# Patient Record
Sex: MALE | Employment: OTHER | URBAN - METROPOLITAN AREA
[De-identification: names, ages, dates, MRNs, and addresses within clinical notes are randomized per-mention and may not be internally consistent; named-entity substitution may affect disease eponyms.]

---

## 2024-06-20 ENCOUNTER — TRANSFERRED RECORDS (OUTPATIENT)
Dept: HEALTH INFORMATION MANAGEMENT | Facility: CLINIC | Age: 69
End: 2024-06-20

## 2024-07-29 ENCOUNTER — TELEPHONE (OUTPATIENT)
Dept: WOUND CARE | Facility: HOSPITAL | Age: 69
End: 2024-07-29

## 2024-07-31 ENCOUNTER — MEDICAL CORRESPONDENCE (OUTPATIENT)
Dept: HEALTH INFORMATION MANAGEMENT | Facility: CLINIC | Age: 69
End: 2024-07-31

## 2024-10-01 NOTE — H&P (VIEW-ONLY)
Lobito Zheng is a 69 y.o. male who presents for a pre-op physical.    Nursing Notes:   Alireza Valladares MA  10/1/2024  9:06 AM  Signed  Date of Surgery: 10/22/2024  Surgeon: Dr Farrar and Dr Lynch  Surgery/Procedure name: cystoprostatectomy    Hospital/Surgical Facility:  [facility] Fax Number:   Surgery type: inpatient  Primary Physician: Pcp, No      Risk Factors  Does the patient have:  Asthma no  CAD no  Renal insufficiency no  Diabetes yes  CHF no  Recent MI no  Valvular heart disease no    Medication Review  Is the patient currently taking:  Antihistamines no  Aspirin or ibuprofen products no  Blood thinners no   If YES, Does patient have to hold medication? N/a  Metformin yes  Seizure medication no    Bleeding Risk  Does the patient have a history of:  Bleeding or easy bruising no  Bleeding into the joints or muscles no  Frequent nosebleeds no    Did the patient have difficulty with bleeding after:  Loss of teeth or dental extractions no  Previous surgery no  Previous injury no    Is there a family history of:  A blood or bleeding disorder no  Blood transfusion no   Malignant Hyperthermia no  Adverse reactions to anesthesia no    Anesthesia Risk  Does the patient have a history of:  Difficulty waking up from anesthesia no  Excessive postop nausea and vomiting no  Sensitivity to narcotics no  Specific sensitivity to anesthesia no  Sleep apnea yes    ALIREZA VALLADARES, [facility]  ....................  10/1/2024   8:55 AM          Reason for surgery -He presents for preoperative evaluation prior to undergoing any cystoprostatectomy for urothelial carcinoma.  He has never had any anesthetic complications nor bleeding issues.  He has had no change in his cardiopulmonary status and he is able to walk up a flight of stairs and do exertional activities without difficulty.    Past Medical History:   . Date     Acid reflux      Cancer of ureteric orifice (HC)      Diabetes mellitus (HC)     A1C 6.7      Erectile dysfunction      Hyperlipidemia      Hypertension        Past Surgical History:   . Laterality Date     COLONOSCOPY SCREENING  2008     COLONOSCOPY W/ POLYPECTOMY  12/17/2018    repeat in 10 years     COLONOSCOPY WITH BIOPSIES AND POYLPECTOMY  02/29/2024    repeat in 1 year     FRACTURE SURGERY       Transurethral Resection of Large Bladder Tumor, Right Ureteral Stent Placement  05/16/2024       No family history on file.    Current Outpatient Medications   Medication Sig Dispense Refill     acetaminophen (TYLENOL EXTRA STRGTH) 500 mg tablet Take 2 Tablets (1,000 mg) by mouth every 6 hours. Max acetaminophen dose: 4000mg in 24 hrs.       amoxicillin (AMOXIL) 500 mg capsule Take 1 Capsule (500 mg) by mouth once daily. To prevent bladder infections 90 Capsule 0     Cranberry 500 mg cap Take 500 mg by mouth once daily. May increase to 2xdaily prn       ibuprofen (ADVIL; MOTRIN) 200 mg tablet Take 3 Tablets (600 mg) by mouth every 6 hours.       medication order composer CPAP  0     melatonin 12 mg tab Take 1 Tablet by mouth at bedtime.       metFORMIN (GLUCOPHAGE) 500 mg tablet Take 500 mg by mouth 2 times daily.       omeprazole (PRILOSEC) 20 mg Delayed-Release capsule Take 1 capsule by mouth once daily before a meal.  0     ondansetron (ZOFRAN) 8 mg tablet Take 1 Tablet (8 mg) by mouth every 8 hours if needed for Nausea/Vomiting. 90 Tablet 3     polyethylene glycol-electrolyte (NULYTELY) 420 gram solution        prochlorperazine (COMPAZINE) 10 mg tablet Take 1 Tablet (10 mg) by mouth every 6 hours if needed for Nausea/Vomiting. 90 Tablet 3     rosuvastatin (CRESTOR) 20 mg tablet Take 20 mg by mouth at bedtime.       sennosides (Senna) 8.6 mg tablet Take 8.6 mg by mouth once daily.       tamsulosin (FLOMAX) 0.4 mg capsule Take 2 Capsules (0.8 mg) by mouth at bedtime. 180 Capsule 0     No current facility-administered medications for this visit.     Medications have been reviewed by me and are current to  "the best of my knowledge and ability.      Allergies: Patient has no known allergies.    Social History     Tobacco Use     Smoking status: Former     Current packs/day: 0.00     Types: Cigarettes     Quit date: 1993     Years since quittin.1     Smokeless tobacco: Never   Substance Use Topics     Alcohol use: Not Currently     Alcohol/week: 12.0 standard drinks of alcohol     Types: 12 Cans of beer per week     Comment: Stopped drinking in 2024       Patient has otherwise been feeling well.  No weight loss.  No headaches.  No visual problems.  Denies chest pain or SOB.  No hemoptysis.  No GI or  sxs.  No skin problems.  No focal neurologic sxs.  No dizziness or fainting spells.  No new skin lesions. Remainder of the ROS is unremarkable.    OBJECTIVE: /92 (Cuff Site: Right Arm, Cuff Size: Adult Large)   Pulse 68   Ht 1.753 m (5' 9\")   Wt 103.6 kg (228 lb 6.4 oz)   SpO2 100%   BMI 33.73 kg/m    EXAM:  General Appearance: Pleasant, alert, appropriate appearance for age. No acute distress  Ear Exam: Normal TM's bilaterally. Normal auditory canals and external ears. Non-tender.  Nose Exam: Normal external nose, mucous membranes, and septum.  OroPharynx Exam: Dental hygiene adequate. Normal buccal mucosa. Normal pharynx.  Chest/Respiratory Exam: Normal chest wall and respirations. Clear to auscultation.  Cardiovascular Exam: Regular rate and rhythm. S1, S2, no murmur, click, gallop, or rubs.  Psychiatric Exam: Alert and oriented, appropriate affect.  Extremities:  No edema, erythema or tenderness.    His EKG shows normal sinus rhythm with no acute ST or T wave changes.  Component  Ref Range & Units  9:25 AM  (10/1/24) 3 wk ago  (24) 1 mo ago  (24) 1 mo ago  (8/15/24) 1 mo ago  (24) 1 mo ago  (24) 1 mo ago  (8/3/24)   WHITE BLOOD COUNT          4.5 - 13.5 thou/cu mm 6.2 5.1 <redacted file path> 5.1 <redacted file path> 5.4 <redacted file path> 6.5 <redacted file path> 6.9 " <redacted file path> 6.6 <redacted file path>   RED BLOOD COUNT            4.70 - 6.10 mil/cu mm 3.55 Low  3.28 <redacted file path> Low <redacted file path>  3.15 <redacted file path> Low <redacted file path>  3.55 <redacted file path> Low <redacted file path>  3.66 <redacted file path> Low <redacted file path>  3.36 <redacted file path> Low <redacted file path>  3.48 <redacted file path> Low <redacted file path>    HEMOGLOBIN                14.0 - 18.0 g/dL 10.4 Low  9.1 <redacted file path> Low <redacted file path>  8.8 <redacted file path> Low <redacted file path>  9.8 <redacted file path> Low <redacted file path>  10.1 <redacted file path> Low <redacted file path>  9.2 <redacted file path> Low <redacted file path>  9.6 <redacted file path> Low <redacted file path>    HEMATOCRIT                42.0 - 52.0 % 32.4 Low  27.7 <redacted file path> Low <redacted file path>  26.8 <redacted file path> Low <redacted file path>  29.5 <redacted file path> Low <redacted file path>  30.7 <redacted file path> Low <redacted file path>  27.8 <redacted file path> Low <redacted file path>  28.9 <redacted file path> Low <redacted file path>    MCV                        80 - 94 fL 91 85 <redacted file path> 85 <redacted file path> 83 <redacted file path> 84 <redacted file path> 83 <redacted file path> 83 <redacted file path>   MCH                        27.0 - 31.0 pg 29.3 27.7 <redacted file path> 27.9 <redacted file path> 27.6 <redacted file path> 27.6 <redacted file path> 27.4 <redacted file path> 27.6 <redacted file path>   MCHC                      33.0 - 36.0 g/dL 32.1 Low  32.9 <redacted file path> Low <redacted file path>  32.8 <redacted file path> Low <redacted file path>  33.2 <redacted file path> 32.9 <redacted file path> Low <redacted file path>  33.1 <redacted file path> 33.2 <redacted file path>   RDW                        11.6 - 14.8 % 22.3 High  20.0 <redacted file path> High <redacted file path>  19.9 <redacted  file path> High <redacted file path>  16.4 <redacted file path> High <redacted file path>  15.9 <redacted file path> High <redacted file path>  15.0 <redacted file path> High <redacted file path>  15.1 <redacted file path> High <redacted file path>    PLATELET COUNT            130 - 400 thou/cu mm 230 248 <redacted file path> 141 <redacted file path> 257 <redacted file path> 214 <redacted file path> 70 <redacted file path> Low <redacted file path>  60 <redacted file path> Low <redacted file path>    MPV                        7.4 - 10.4 fL 10.0 9.5 <redacted file path> 9.7 <redacted file path> 9.3 <redacted file path> 9.8 <redacted file path> 11.0 <redacted file path> High <redacted file path>  10.9 <redacted file path> High <redacted file path>    NRBC                      0.0 - 0.9 % 0.0 0.6 <redacted file path> 0.0 <redacted file path> 0.0 <redacted file path> 0.0 <redacted file path> 0.0 <redacted file path> 0.0 <redacted file path>   % NEUT  37.0 - 80.0 % 55.8  57.0 <redacted file path>  65.6 <redacted file path> 66.5 <redacted file path> 66.3 <redacted file path>   % LYMPH  10.0 - 50.0 % 26.5  26.4 <redacted file path>  20.7 <redacted file path> 21.5 <redacted file path> 21.0 <redacted file path>   % MONO  0.0 - 12.0 % 14.0 High   14.2 <redacted file path> High <redacted file path>   11.0 <redacted file path> 10.3 <redacted file path> 10.5 <redacted file path>   % EOS  0.0 - 7.0 % 1.6  1.0 <redacted file path>  0.9 <redacted file path> 0.7 <redacted file path> 0.5 <redacted file path>   % BASO  0.0 - 2.5 % 0.8  0.4 <redacted file path>  0.3 <redacted file path> 0.1 <redacted file path> 0.2 <redacted file path>   % IMMATURE GRAN (METAS,MYELOS,PROS)  % 1.3  1.0 <redacted file path>  1.5 <redacted file path> 0.9 <redacted file path> 1.5 <redacted file path>   ABSOLUTE NEUTROPHILS      2.0 - 6.9 thou/cu mm 3.5  2.9 <redacted file path>  4.3 <redacted file path> 4.6 <redacted file path> 4.4 <redacted file path>    ABSOLUTE LYMPHOCYTES      0.6 - 3.4 thou/cu mm 1.6  1.3 <redacted file path>  1.4 <redacted file path> 1.5 <redacted file path> 1.4 <redacted file path>   ABSOLUTE MONOCYTES        0.0 - 1.0 thou/cu mm 0.9  0.7 <redacted file path>  0.7 <redacted file path> 0.7 <redacted file path> 0.7 <redacted file path>   ABSOLUTE EOSINOPHILS      <=0.7 thou/cu mm 0.1  0.1 <redacted file path>  0.1 <redacted file path> 0.1 <redacted file path> 0.0 <redacted file path>   ABSOLUTE BASOPHILS        <0.2 thou/cu mm 0.1  0.0 <redacted file path>  0.0 <redacted file path> 0.0 <redacted file path> 0.0 <redacted file path>   ABSOLUTE IMMATURE GRANULOCYTES(METAS,MYELOS,PROS)  <0.3 thou/cu mm 0.1  0.1 <redacted file path>  0.1 <redacted file path> 0.1 <redacted file path> 0.1 <redacted file path>   Resulting Agency [facility] <redacted file path>           Component  Ref Range & Units  9:25 AM  (10/1/24) 3 wk ago  (9/5/24) 1 mo ago  (8/29/24) 1 mo ago  (8/15/24) 1 mo ago  (8/8/24) 1 mo ago  (8/4/24) 1 mo ago  (8/3/24)   SODIUM  136 - 145 mmol/L 142 139 <redacted file path> 142 <redacted file path> 140 <redacted file path> 142 <redacted file path> 140 <redacted file path> 139 <redacted file path>   POTASSIUM  3.5 - 5.1 mmol/L 4.5 4.4 <redacted file path> 4.3 <redacted file path> 4.3 <redacted file path> 4.6 <redacted file path> 4.2 <redacted file path> 4.2 <redacted file path>   CHLORIDE  98 - 107 mmol/L 110 High  107 <redacted file path> 109 <redacted file path> High <redacted file path>  108 <redacted file path> High <redacted file path>  110 <redacted file path> High <redacted file path>  108 <redacted file path> High <redacted file path>  107 <redacted file path>   CO2,TOTAL  22 - 31 mmol/L 24 24 <redacted file path> 21 <redacted file path> Low <redacted file path>  24 <redacted file path> 21 <redacted file path> Low <redacted file path>  20 <redacted file path> Low <redacted file path>  21 <redacted file path> Low <redacted file path>     ANION GAP  5 - 18 8 8 <redacted file path> 12 <redacted file path> 8 <redacted file path> 11 <redacted file path> 12 <redacted file path> 11 <redacted file path>   GLUCOSE  70 - 99 mg/dL 113 High  102 <redacted file path> High <redacted file path>  101 <redacted file path> High <redacted file path>  96 <redacted file path> 101 <redacted file path> High <redacted file path>  86 <redacted file path> 90 <redacted file path>   CALCIUM  8.4 - 10.2 mg/dL 10.6 High  9.6 <redacted file path> 9.4 <redacted file path> 9.6 <redacted file path> 10.0 <redacted file path> 9.4 <redacted file path> 9.5 <redacted file path>   BUN  8 - 25 mg/dL 16 20 <redacted file path> 17 <redacted file path> 17 <redacted file path> 15 <redacted file path> 12 <redacted file path> 15 <redacted file path>   CREATININE  0.72 - 1.25 mg/dL 1.42 High  1.22 <redacted file path> 1.23 <redacted file path> 1.19 <redacted file path> 1.25 <redacted file path> 1.19 <redacted file path> 1.24 <redacted file path>   BUN/CREAT RATIO            10 - 20                 11 16 <redacted file path> 14 <redacted file path> 14 <redacted file path> 12 <redacted file path> 10 <redacted file path> 12 <redacted file path>   ALBUMIN  3.2 - 4.6 g/dL 4.3 4.0 <redacted file path> 4.0 <redacted file path> 3.9 <redacted file path> 3.8 <redacted file path>     PROTEIN,TOTAL  6.0 - 8.0 g/dL 7.1 6.8 <redacted file path> 6.8 <redacted file path> 6.6 <redacted file path> 7.3 <redacted file path>     GLOBULIN                  2.0 - 3.7 g/dL 2.8 2.8 <redacted file path> 2.8 <redacted file path> 2.7 <redacted file path> 3.5 <redacted file path>     A/G RATIO  1.0 - 2.0                 1.5 1.4 <redacted file path> 1.4 <redacted file path> 1.4 <redacted file path> 1.1 <redacted file path>     BILIRUBIN,TOTAL  0.2 - 1.2 mg/dL 0.4 0.3 <redacted file path> 0.3 <redacted file path> 0.3 <redacted file path> 0.3 <redacted file path>     ALK PHOSPHATASE IRMA  40 - 150 IU/L 54 57 <redacted file  path> 58 <redacted file path> 59 <redacted file path> 72 <redacted file path>     ALT (SGPT) IRMA  <=55 IU/L 17 26 <redacted file path> 24 <redacted file path> 30 <redacted file path> 37 <redacted file path>     AST (SGOT) IRMA  5 - 34 U/L 21 19 <redacted file path> 20 <redacted file path> 19 <redacted file path> 19 <redacted file path>     eGFR  >90 mL/min/1.73m2 54 Low  65 <redacted file path> Low <redacted file path>  CM 64 <redacted file path> Low <redacted file path>  CM 67 <redacted file path> Low <redacted file path>  CM 63 <redacted file path> Low <redacted file path>  CM 67 <redacted file path> Low <redacted file path>  CM 64 <redacted file path> Low <redacted file path>  CM   Comment: As of 03/15/2022, eGFR is calculated by the CKD-EPI creatinine equation without race adjustment.  eGFR can be influenced by muscle mass, exercise, and diet.  The reported eGFR is an estimation only and is only applicable if the renal function is stable.   Resulting Agency IR[facility] <redacted file path> [facility] <redacted file path> [facility] <redacted file path> [facility] <redacted file path> [facility] <redacted file path> [facility] <redacted file path> IRMA <redacted file path>                  ASSESSMENT :/PLAN:    ICD-10-CM    1. Pre-op evaluation  Z01.818 COMP METABOLIC PANEL     CBC AND DIFFERENTIAL     EKG 12 LEAD     IL ECG ROUTINE ECG W/LEAST 12 LDS TRCG ONLY W/O I&R      2. Urothelial carcinoma (HC)  C68.9           He has no contraindications for anesthesia of any type.  He is at low risk for perianesthetic complications for this procedure.  No further preoperative workup is recommended.  His last hemoglobin A1c was 6.6.  He should hold his metformin the day before surgery and take no medications the morning of surgery.  He should avoid nonsteroidal anti-inflammatories and aspirin 1 week prior to surgery.      Sid Neil MD ....................  10/1/2024   9:26 AM

## 2024-10-17 ENCOUNTER — HOSPITAL ENCOUNTER (OUTPATIENT)
Dept: WOUND CARE | Facility: HOSPITAL | Age: 69
Discharge: HOME | End: 2024-10-17
Attending: PHYSICAL MEDICINE & REHABILITATION | Admitting: PHYSICAL MEDICINE & REHABILITATION
Payer: COMMERCIAL

## 2024-10-17 DIAGNOSIS — Z43.6 ATTENTION TO UROSTOMY (H): Primary | ICD-10-CM

## 2024-10-17 PROCEDURE — G0463 HOSPITAL OUTPT CLINIC VISIT: HCPCS

## 2024-10-17 NOTE — PROGRESS NOTES
[facility]  PRE-OP OSTOMY CONSULTATION AND ASSESSMENT    INTAKE    Type of Stoma Planned: Permanent Urostomy    Diagnosis Pertinent to Stoma: Cancer - Bladder  Date of Diagnosis: early 2024  Type of Surgery: Ileal Conduit Surgery Date: 10/22/2024  Surgeon: [facility]: [facility]    Current Living Situation: House  Anticipated Discharge Location Post Hospital Stay: House    EDUCATION ASSESSMENT  Present for Teaching Session: Patient and Spouse/Significant Other   Present: NA    Significant Vision Issues: No  Hand Dexterity Issues: No    Pertinent Information/Identified Barriers to Independent Care: none    Stoma Site Marking    Assessed Patient while: Lying, Sitting, and Standing  Marked in all 4 quadrants, RUQ being preferred with: Permanent Marker, Covered with Tegaderm, and Marker and extra Tegaderm sent with patient    EDUCATION    Teaching Folder Given: NA  Instruction: WOC Role, Activity, Anatomy, Bathing: Ostomy supplies are water proof, Pouching Products: Showed pouching system, and discussed hospital stay, they were told 5 days,     Total Time Spent with Patient: 45 minutes    sherie jett

## 2024-10-21 ENCOUNTER — ANESTHESIA EVENT (OUTPATIENT)
Dept: SURGERY | Facility: HOSPITAL | Age: 69
DRG: 653 | End: 2024-10-21
Payer: COMMERCIAL

## 2024-10-21 RX ORDER — PROCHLORPERAZINE MALEATE 10 MG
10 TABLET ORAL EVERY 6 HOURS PRN
Status: ON HOLD | COMMUNITY
End: 2024-10-22

## 2024-10-21 RX ORDER — ONDANSETRON 8 MG/1
8 TABLET, FILM COATED ORAL EVERY 8 HOURS PRN
Status: ON HOLD | COMMUNITY
End: 2024-10-22

## 2024-10-21 RX ORDER — TAMSULOSIN HYDROCHLORIDE 0.4 MG/1
0.8 CAPSULE ORAL DAILY
COMMUNITY

## 2024-10-21 RX ORDER — ROSUVASTATIN CALCIUM 40 MG/1
40 TABLET, COATED ORAL AT BEDTIME
COMMUNITY

## 2024-10-21 RX ORDER — ACETAMINOPHEN 500 MG
1000 TABLET ORAL EVERY 6 HOURS PRN
COMMUNITY

## 2024-10-21 RX ORDER — AMOXICILLIN 500 MG/1
500 CAPSULE ORAL DAILY
Status: ON HOLD | COMMUNITY
Start: 2024-08-09 | End: 2024-10-31

## 2024-10-21 RX ORDER — SENNOSIDES 8.6 MG
1 TABLET ORAL DAILY
Status: ON HOLD | COMMUNITY
End: 2024-10-22

## 2024-10-21 RX ORDER — CHLORHEXIDINE GLUCONATE 4 %
12 LIQUID (ML) TOPICAL AT BEDTIME
COMMUNITY

## 2024-10-22 ENCOUNTER — HOSPITAL ENCOUNTER (INPATIENT)
Facility: HOSPITAL | Age: 69
LOS: 9 days | Discharge: HOME HEALTH | DRG: 653 | End: 2024-10-31
Attending: STUDENT IN AN ORGANIZED HEALTH CARE EDUCATION/TRAINING PROGRAM | Admitting: STUDENT IN AN ORGANIZED HEALTH CARE EDUCATION/TRAINING PROGRAM
Payer: COMMERCIAL

## 2024-10-22 ENCOUNTER — ANESTHESIA (OUTPATIENT)
Dept: SURGERY | Facility: HOSPITAL | Age: 69
DRG: 653 | End: 2024-10-22
Payer: COMMERCIAL

## 2024-10-22 DIAGNOSIS — Z43.6 ATTENTION TO UROSTOMY (H): Primary | ICD-10-CM

## 2024-10-22 DIAGNOSIS — K56.0 PARALYTIC ILEUS (H): ICD-10-CM

## 2024-10-22 DIAGNOSIS — Z78.9 DEEP VEIN THROMBOSIS (DVT) PROPHYLAXIS PRESCRIBED AT DISCHARGE: ICD-10-CM

## 2024-10-22 PROBLEM — C67.9 BLADDER CANCER (H): Status: ACTIVE | Noted: 2024-10-22

## 2024-10-22 LAB
ABO/RH(D): NORMAL
ABO/RH(D): NORMAL
ANION GAP SERPL CALCULATED.3IONS-SCNC: 14 MMOL/L (ref 7–15)
ANTIBODY SCREEN: NEGATIVE
ANTIBODY SCREEN: NEGATIVE
BUN SERPL-MCNC: 20.3 MG/DL (ref 8–23)
CALCIUM SERPL-MCNC: 8.9 MG/DL (ref 8.8–10.4)
CHLORIDE SERPL-SCNC: 104 MMOL/L (ref 98–107)
CREAT SERPL-MCNC: 1.48 MG/DL (ref 0.67–1.17)
EGFRCR SERPLBLD CKD-EPI 2021: 52 ML/MIN/1.73M2
ERYTHROCYTE [DISTWIDTH] IN BLOOD BY AUTOMATED COUNT: 17.4 % (ref 10–15)
EST. AVERAGE GLUCOSE BLD GHB EST-MCNC: 105 MG/DL
GLUCOSE BLDC GLUCOMTR-MCNC: 157 MG/DL (ref 70–99)
GLUCOSE BLDC GLUCOMTR-MCNC: 163 MG/DL (ref 70–99)
GLUCOSE BLDC GLUCOMTR-MCNC: 181 MG/DL (ref 70–99)
GLUCOSE BLDC GLUCOMTR-MCNC: 91 MG/DL (ref 70–99)
GLUCOSE SERPL-MCNC: 167 MG/DL (ref 70–99)
HBA1C MFR BLD: 5.3 %
HCO3 SERPL-SCNC: 22 MMOL/L (ref 22–29)
HCT VFR BLD AUTO: 33 % (ref 40–53)
HGB BLD-MCNC: 10.4 G/DL (ref 13.3–17.7)
HGB BLD-MCNC: 10.6 G/DL (ref 13.3–17.7)
HOLD SPECIMEN: NORMAL
HOLD SPECIMEN: NORMAL
IRON BINDING CAPACITY (ROCHE): 292 UG/DL (ref 240–430)
IRON SATN MFR SERPL: 17 % (ref 15–46)
IRON SERPL-MCNC: 51 UG/DL (ref 61–157)
MCH RBC QN AUTO: 29.1 PG (ref 26.5–33)
MCHC RBC AUTO-ENTMCNC: 31.5 G/DL (ref 31.5–36.5)
MCV RBC AUTO: 92 FL (ref 78–100)
PLATELET # BLD AUTO: 125 10E3/UL (ref 150–450)
PLATELET # BLD AUTO: 143 10E3/UL (ref 150–450)
POTASSIUM SERPL-SCNC: 4.6 MMOL/L (ref 3.4–5.3)
RBC # BLD AUTO: 3.58 10E6/UL (ref 4.4–5.9)
RETICS # AUTO: 0.09 10E6/UL (ref 0.03–0.1)
RETICS/RBC NFR AUTO: 2.4 % (ref 0.5–2)
SODIUM SERPL-SCNC: 140 MMOL/L (ref 135–145)
SPECIMEN EXPIRATION DATE: NORMAL
SPECIMEN EXPIRATION DATE: NORMAL
WBC # BLD AUTO: 12.2 10E3/UL (ref 4–11)

## 2024-10-22 PROCEDURE — 88331 PATH CONSLTJ SURG 1 BLK 1SPC: CPT | Mod: TC | Performed by: STUDENT IN AN ORGANIZED HEALTH CARE EDUCATION/TRAINING PROGRAM

## 2024-10-22 PROCEDURE — 258N000003 HC RX IP 258 OP 636: Performed by: ANESTHESIOLOGY

## 2024-10-22 PROCEDURE — 8E0W4CZ ROBOTIC ASSISTED PROCEDURE OF TRUNK REGION, PERCUTANEOUS ENDOSCOPIC APPROACH: ICD-10-PCS | Performed by: STUDENT IN AN ORGANIZED HEALTH CARE EDUCATION/TRAINING PROGRAM

## 2024-10-22 PROCEDURE — 36415 COLL VENOUS BLD VENIPUNCTURE: CPT | Performed by: STUDENT IN AN ORGANIZED HEALTH CARE EDUCATION/TRAINING PROGRAM

## 2024-10-22 PROCEDURE — 258N000003 HC RX IP 258 OP 636: Performed by: STUDENT IN AN ORGANIZED HEALTH CARE EDUCATION/TRAINING PROGRAM

## 2024-10-22 PROCEDURE — 258N000003 HC RX IP 258 OP 636: Performed by: NURSE ANESTHETIST, CERTIFIED REGISTERED

## 2024-10-22 PROCEDURE — 250N000009 HC RX 250: Performed by: ANESTHESIOLOGY

## 2024-10-22 PROCEDURE — 250N000011 HC RX IP 250 OP 636: Performed by: STUDENT IN AN ORGANIZED HEALTH CARE EDUCATION/TRAINING PROGRAM

## 2024-10-22 PROCEDURE — 120N000001 HC R&B MED SURG/OB

## 2024-10-22 PROCEDURE — 250N000011 HC RX IP 250 OP 636: Performed by: NURSE ANESTHETIST, CERTIFIED REGISTERED

## 2024-10-22 PROCEDURE — 250N000013 HC RX MED GY IP 250 OP 250 PS 637: Performed by: ANESTHESIOLOGY

## 2024-10-22 PROCEDURE — 0TTB4ZZ RESECTION OF BLADDER, PERCUTANEOUS ENDOSCOPIC APPROACH: ICD-10-PCS | Performed by: STUDENT IN AN ORGANIZED HEALTH CARE EDUCATION/TRAINING PROGRAM

## 2024-10-22 PROCEDURE — 86901 BLOOD TYPING SEROLOGIC RH(D): CPT | Performed by: NURSE PRACTITIONER

## 2024-10-22 PROCEDURE — 250N000009 HC RX 250: Performed by: NURSE ANESTHETIST, CERTIFIED REGISTERED

## 2024-10-22 PROCEDURE — 250N000025 HC SEVOFLURANE, PER MIN: Performed by: STUDENT IN AN ORGANIZED HEALTH CARE EDUCATION/TRAINING PROGRAM

## 2024-10-22 PROCEDURE — 0VT34ZZ RESECTION OF BILATERAL SEMINAL VESICLES, PERCUTANEOUS ENDOSCOPIC APPROACH: ICD-10-PCS | Performed by: STUDENT IN AN ORGANIZED HEALTH CARE EDUCATION/TRAINING PROGRAM

## 2024-10-22 PROCEDURE — 94660 CPAP INITIATION&MGMT: CPT

## 2024-10-22 PROCEDURE — 85049 AUTOMATED PLATELET COUNT: CPT | Performed by: STUDENT IN AN ORGANIZED HEALTH CARE EDUCATION/TRAINING PROGRAM

## 2024-10-22 PROCEDURE — 5A09357 ASSISTANCE WITH RESPIRATORY VENTILATION, LESS THAN 24 CONSECUTIVE HOURS, CONTINUOUS POSITIVE AIRWAY PRESSURE: ICD-10-PCS | Performed by: STUDENT IN AN ORGANIZED HEALTH CARE EDUCATION/TRAINING PROGRAM

## 2024-10-22 PROCEDURE — 250N000013 HC RX MED GY IP 250 OP 250 PS 637: Performed by: STUDENT IN AN ORGANIZED HEALTH CARE EDUCATION/TRAINING PROGRAM

## 2024-10-22 PROCEDURE — 0VT04ZZ RESECTION OF PROSTATE, PERCUTANEOUS ENDOSCOPIC APPROACH: ICD-10-PCS | Performed by: STUDENT IN AN ORGANIZED HEALTH CARE EDUCATION/TRAINING PROGRAM

## 2024-10-22 PROCEDURE — 360N000080 HC SURGERY LEVEL 7, PER MIN: Performed by: STUDENT IN AN ORGANIZED HEALTH CARE EDUCATION/TRAINING PROGRAM

## 2024-10-22 PROCEDURE — 99223 1ST HOSP IP/OBS HIGH 75: CPT | Performed by: STUDENT IN AN ORGANIZED HEALTH CARE EDUCATION/TRAINING PROGRAM

## 2024-10-22 PROCEDURE — 83550 IRON BINDING TEST: CPT | Performed by: STUDENT IN AN ORGANIZED HEALTH CARE EDUCATION/TRAINING PROGRAM

## 2024-10-22 PROCEDURE — 82728 ASSAY OF FERRITIN: CPT | Performed by: STUDENT IN AN ORGANIZED HEALTH CARE EDUCATION/TRAINING PROGRAM

## 2024-10-22 PROCEDURE — 82607 VITAMIN B-12: CPT | Performed by: STUDENT IN AN ORGANIZED HEALTH CARE EDUCATION/TRAINING PROGRAM

## 2024-10-22 PROCEDURE — 0TP94DZ REMOVAL OF INTRALUMINAL DEVICE FROM URETER, PERCUTANEOUS ENDOSCOPIC APPROACH: ICD-10-PCS | Performed by: STUDENT IN AN ORGANIZED HEALTH CARE EDUCATION/TRAINING PROGRAM

## 2024-10-22 PROCEDURE — 36415 COLL VENOUS BLD VENIPUNCTURE: CPT | Performed by: NURSE PRACTITIONER

## 2024-10-22 PROCEDURE — 83036 HEMOGLOBIN GLYCOSYLATED A1C: CPT | Performed by: STUDENT IN AN ORGANIZED HEALTH CARE EDUCATION/TRAINING PROGRAM

## 2024-10-22 PROCEDURE — 85018 HEMOGLOBIN: CPT | Performed by: STUDENT IN AN ORGANIZED HEALTH CARE EDUCATION/TRAINING PROGRAM

## 2024-10-22 PROCEDURE — P9045 ALBUMIN (HUMAN), 5%, 250 ML: HCPCS | Performed by: NURSE ANESTHETIST, CERTIFIED REGISTERED

## 2024-10-22 PROCEDURE — 86900 BLOOD TYPING SEROLOGIC ABO: CPT | Performed by: NURSE PRACTITIONER

## 2024-10-22 PROCEDURE — 710N000009 HC RECOVERY PHASE 1, LEVEL 1, PER MIN: Performed by: STUDENT IN AN ORGANIZED HEALTH CARE EDUCATION/TRAINING PROGRAM

## 2024-10-22 PROCEDURE — 250N000011 HC RX IP 250 OP 636: Performed by: ANESTHESIOLOGY

## 2024-10-22 PROCEDURE — 0T1847C BYPASS BILATERAL URETERS TO ILEOCUTANEOUS WITH AUTOLOGOUS TISSUE SUBSTITUTE, PERCUTANEOUS ENDOSCOPIC APPROACH: ICD-10-PCS | Performed by: STUDENT IN AN ORGANIZED HEALTH CARE EDUCATION/TRAINING PROGRAM

## 2024-10-22 PROCEDURE — 82746 ASSAY OF FOLIC ACID SERUM: CPT | Performed by: STUDENT IN AN ORGANIZED HEALTH CARE EDUCATION/TRAINING PROGRAM

## 2024-10-22 PROCEDURE — 51595 REMOVE BLADDER/REVISE TRACT: CPT | Performed by: NURSE ANESTHETIST, CERTIFIED REGISTERED

## 2024-10-22 PROCEDURE — 272N000001 HC OR GENERAL SUPPLY STERILE: Performed by: STUDENT IN AN ORGANIZED HEALTH CARE EDUCATION/TRAINING PROGRAM

## 2024-10-22 PROCEDURE — 80048 BASIC METABOLIC PNL TOTAL CA: CPT | Performed by: STUDENT IN AN ORGANIZED HEALTH CARE EDUCATION/TRAINING PROGRAM

## 2024-10-22 PROCEDURE — 250N000011 HC RX IP 250 OP 636: Performed by: NURSE PRACTITIONER

## 2024-10-22 PROCEDURE — 370N000017 HC ANESTHESIA TECHNICAL FEE, PER MIN: Performed by: STUDENT IN AN ORGANIZED HEALTH CARE EDUCATION/TRAINING PROGRAM

## 2024-10-22 PROCEDURE — C2617 STENT, NON-COR, TEM W/O DEL: HCPCS | Performed by: STUDENT IN AN ORGANIZED HEALTH CARE EDUCATION/TRAINING PROGRAM

## 2024-10-22 PROCEDURE — 07TC4ZZ RESECTION OF PELVIS LYMPHATIC, PERCUTANEOUS ENDOSCOPIC APPROACH: ICD-10-PCS | Performed by: STUDENT IN AN ORGANIZED HEALTH CARE EDUCATION/TRAINING PROGRAM

## 2024-10-22 PROCEDURE — 999N000141 HC STATISTIC PRE-PROCEDURE NURSING ASSESSMENT: Performed by: STUDENT IN AN ORGANIZED HEALTH CARE EDUCATION/TRAINING PROGRAM

## 2024-10-22 PROCEDURE — 36415 COLL VENOUS BLD VENIPUNCTURE: CPT | Performed by: NURSE ANESTHETIST, CERTIFIED REGISTERED

## 2024-10-22 PROCEDURE — 51595 REMOVE BLADDER/REVISE TRACT: CPT | Performed by: ANESTHESIOLOGY

## 2024-10-22 PROCEDURE — 86900 BLOOD TYPING SEROLOGIC ABO: CPT | Performed by: NURSE ANESTHETIST, CERTIFIED REGISTERED

## 2024-10-22 PROCEDURE — 85045 AUTOMATED RETICULOCYTE COUNT: CPT | Performed by: STUDENT IN AN ORGANIZED HEALTH CARE EDUCATION/TRAINING PROGRAM

## 2024-10-22 PROCEDURE — 999N000157 HC STATISTIC RCP TIME EA 10 MIN

## 2024-10-22 PROCEDURE — 86901 BLOOD TYPING SEROLOGIC RH(D): CPT | Performed by: NURSE ANESTHETIST, CERTIFIED REGISTERED

## 2024-10-22 PROCEDURE — 250N000009 HC RX 250: Performed by: STUDENT IN AN ORGANIZED HEALTH CARE EDUCATION/TRAINING PROGRAM

## 2024-10-22 DEVICE — URINARY DIVERSION STENT SET
Type: IMPLANTABLE DEVICE | Site: URETER | Status: FUNCTIONAL
Brand: PERCUFLEX™ URINARY DIVERSION STENT SET

## 2024-10-22 RX ORDER — ACETAMINOPHEN 325 MG/1
650 TABLET ORAL EVERY 4 HOURS PRN
Status: DISCONTINUED | OUTPATIENT
Start: 2024-10-25 | End: 2024-10-31 | Stop reason: HOSPADM

## 2024-10-22 RX ORDER — BUPIVACAINE HYDROCHLORIDE 5 MG/ML
INJECTION, SOLUTION PERINEURAL PRN
Status: DISCONTINUED | OUTPATIENT
Start: 2024-10-22 | End: 2024-10-22 | Stop reason: HOSPADM

## 2024-10-22 RX ORDER — ONDANSETRON 2 MG/ML
4 INJECTION INTRAMUSCULAR; INTRAVENOUS EVERY 6 HOURS PRN
Status: DISCONTINUED | OUTPATIENT
Start: 2024-10-22 | End: 2024-10-31 | Stop reason: HOSPADM

## 2024-10-22 RX ORDER — NALOXONE HYDROCHLORIDE 0.4 MG/ML
0.4 INJECTION, SOLUTION INTRAMUSCULAR; INTRAVENOUS; SUBCUTANEOUS
Status: DISCONTINUED | OUTPATIENT
Start: 2024-10-22 | End: 2024-10-31 | Stop reason: HOSPADM

## 2024-10-22 RX ORDER — PANTOPRAZOLE SODIUM 40 MG/1
40 TABLET, DELAYED RELEASE ORAL
Status: DISCONTINUED | OUTPATIENT
Start: 2024-10-22 | End: 2024-10-31 | Stop reason: HOSPADM

## 2024-10-22 RX ORDER — ACETAMINOPHEN 325 MG/1
975 TABLET ORAL EVERY 8 HOURS
Status: COMPLETED | OUTPATIENT
Start: 2024-10-22 | End: 2024-10-25

## 2024-10-22 RX ORDER — LABETALOL HYDROCHLORIDE 5 MG/ML
5 INJECTION, SOLUTION INTRAVENOUS EVERY 10 MIN PRN
Status: DISCONTINUED | OUTPATIENT
Start: 2024-10-22 | End: 2024-10-22 | Stop reason: HOSPADM

## 2024-10-22 RX ORDER — MAGNESIUM SULFATE 4 G/50ML
4 INJECTION INTRAVENOUS ONCE
Status: COMPLETED | OUTPATIENT
Start: 2024-10-22 | End: 2024-10-22

## 2024-10-22 RX ORDER — KETAMINE HYDROCHLORIDE 10 MG/ML
INJECTION INTRAMUSCULAR; INTRAVENOUS PRN
Status: DISCONTINUED | OUTPATIENT
Start: 2024-10-22 | End: 2024-10-22

## 2024-10-22 RX ORDER — PROPOFOL 10 MG/ML
INJECTION, EMULSION INTRAVENOUS PRN
Status: DISCONTINUED | OUTPATIENT
Start: 2024-10-22 | End: 2024-10-22

## 2024-10-22 RX ORDER — AMOXICILLIN 250 MG
1 CAPSULE ORAL 2 TIMES DAILY
Status: CANCELLED | OUTPATIENT
Start: 2024-10-22

## 2024-10-22 RX ORDER — BISACODYL 10 MG
10 SUPPOSITORY, RECTAL RECTAL DAILY PRN
Status: CANCELLED | OUTPATIENT
Start: 2024-10-25

## 2024-10-22 RX ORDER — ACETAMINOPHEN 325 MG/1
975 TABLET ORAL ONCE
Status: COMPLETED | OUTPATIENT
Start: 2024-10-22 | End: 2024-10-22

## 2024-10-22 RX ORDER — DEXAMETHASONE SODIUM PHOSPHATE 4 MG/ML
4 INJECTION, SOLUTION INTRA-ARTICULAR; INTRALESIONAL; INTRAMUSCULAR; INTRAVENOUS; SOFT TISSUE
Status: DISCONTINUED | OUTPATIENT
Start: 2024-10-22 | End: 2024-10-22 | Stop reason: HOSPADM

## 2024-10-22 RX ORDER — NALOXONE HYDROCHLORIDE 0.4 MG/ML
0.1 INJECTION, SOLUTION INTRAMUSCULAR; INTRAVENOUS; SUBCUTANEOUS
Status: DISCONTINUED | OUTPATIENT
Start: 2024-10-22 | End: 2024-10-22 | Stop reason: HOSPADM

## 2024-10-22 RX ORDER — DEXTROSE MONOHYDRATE 25 G/50ML
25-50 INJECTION, SOLUTION INTRAVENOUS
Status: DISCONTINUED | OUTPATIENT
Start: 2024-10-22 | End: 2024-10-22

## 2024-10-22 RX ORDER — METHOCARBAMOL 500 MG/1
500 TABLET, FILM COATED ORAL EVERY 6 HOURS PRN
Status: DISCONTINUED | OUTPATIENT
Start: 2024-10-22 | End: 2024-10-31 | Stop reason: HOSPADM

## 2024-10-22 RX ORDER — NALOXONE HYDROCHLORIDE 0.4 MG/ML
0.2 INJECTION, SOLUTION INTRAMUSCULAR; INTRAVENOUS; SUBCUTANEOUS
Status: DISCONTINUED | OUTPATIENT
Start: 2024-10-22 | End: 2024-10-31 | Stop reason: HOSPADM

## 2024-10-22 RX ORDER — DEXAMETHASONE SODIUM PHOSPHATE 10 MG/ML
INJECTION, SOLUTION INTRAMUSCULAR; INTRAVENOUS PRN
Status: DISCONTINUED | OUTPATIENT
Start: 2024-10-22 | End: 2024-10-22

## 2024-10-22 RX ORDER — SODIUM CHLORIDE, SODIUM LACTATE, POTASSIUM CHLORIDE, AND CALCIUM CHLORIDE .6; .31; .03; .02 G/100ML; G/100ML; G/100ML; G/100ML
IRRIGANT IRRIGATION PRN
Status: DISCONTINUED | OUTPATIENT
Start: 2024-10-22 | End: 2024-10-22 | Stop reason: HOSPADM

## 2024-10-22 RX ORDER — SODIUM CHLORIDE, SODIUM LACTATE, POTASSIUM CHLORIDE, CALCIUM CHLORIDE 600; 310; 30; 20 MG/100ML; MG/100ML; MG/100ML; MG/100ML
INJECTION, SOLUTION INTRAVENOUS CONTINUOUS
Status: DISCONTINUED | OUTPATIENT
Start: 2024-10-22 | End: 2024-10-22 | Stop reason: HOSPADM

## 2024-10-22 RX ORDER — HYDROMORPHONE HCL IN WATER/PF 6 MG/30 ML
0.2 PATIENT CONTROLLED ANALGESIA SYRINGE INTRAVENOUS EVERY 5 MIN PRN
Status: DISCONTINUED | OUTPATIENT
Start: 2024-10-22 | End: 2024-10-22 | Stop reason: HOSPADM

## 2024-10-22 RX ORDER — HEPARIN SODIUM 5000 [USP'U]/.5ML
5000 INJECTION, SOLUTION INTRAVENOUS; SUBCUTANEOUS ONCE
Status: COMPLETED | OUTPATIENT
Start: 2024-10-22 | End: 2024-10-22

## 2024-10-22 RX ORDER — HYDROMORPHONE HCL IN WATER/PF 6 MG/30 ML
0.4 PATIENT CONTROLLED ANALGESIA SYRINGE INTRAVENOUS
Status: DISCONTINUED | OUTPATIENT
Start: 2024-10-22 | End: 2024-10-24

## 2024-10-22 RX ORDER — NICOTINE POLACRILEX 4 MG
15-30 LOZENGE BUCCAL
Status: DISCONTINUED | OUTPATIENT
Start: 2024-10-22 | End: 2024-10-22

## 2024-10-22 RX ORDER — FENTANYL CITRATE 50 UG/ML
INJECTION, SOLUTION INTRAMUSCULAR; INTRAVENOUS PRN
Status: DISCONTINUED | OUTPATIENT
Start: 2024-10-22 | End: 2024-10-22

## 2024-10-22 RX ORDER — GINSENG 100 MG
CAPSULE ORAL 3 TIMES DAILY
Status: DISCONTINUED | OUTPATIENT
Start: 2024-10-22 | End: 2024-10-22

## 2024-10-22 RX ORDER — HYDROMORPHONE HYDROCHLORIDE 1 MG/ML
0.5 INJECTION, SOLUTION INTRAMUSCULAR; INTRAVENOUS; SUBCUTANEOUS EVERY 5 MIN PRN
Status: DISCONTINUED | OUTPATIENT
Start: 2024-10-22 | End: 2024-10-22 | Stop reason: HOSPADM

## 2024-10-22 RX ORDER — CEFAZOLIN SODIUM/WATER 2 G/20 ML
2 SYRINGE (ML) INTRAVENOUS SEE ADMIN INSTRUCTIONS
Status: DISCONTINUED | OUTPATIENT
Start: 2024-10-22 | End: 2024-10-22 | Stop reason: HOSPADM

## 2024-10-22 RX ORDER — SODIUM CHLORIDE, SODIUM LACTATE, POTASSIUM CHLORIDE, CALCIUM CHLORIDE 600; 310; 30; 20 MG/100ML; MG/100ML; MG/100ML; MG/100ML
INJECTION, SOLUTION INTRAVENOUS CONTINUOUS
Status: DISCONTINUED | OUTPATIENT
Start: 2024-10-22 | End: 2024-10-23

## 2024-10-22 RX ORDER — ONDANSETRON 4 MG/1
4 TABLET, ORALLY DISINTEGRATING ORAL EVERY 30 MIN PRN
Status: DISCONTINUED | OUTPATIENT
Start: 2024-10-22 | End: 2024-10-22 | Stop reason: HOSPADM

## 2024-10-22 RX ORDER — ONDANSETRON 2 MG/ML
4 INJECTION INTRAMUSCULAR; INTRAVENOUS EVERY 30 MIN PRN
Status: DISCONTINUED | OUTPATIENT
Start: 2024-10-22 | End: 2024-10-22 | Stop reason: HOSPADM

## 2024-10-22 RX ORDER — LIDOCAINE 40 MG/G
CREAM TOPICAL
Status: DISCONTINUED | OUTPATIENT
Start: 2024-10-22 | End: 2024-10-31 | Stop reason: HOSPADM

## 2024-10-22 RX ORDER — PROCHLORPERAZINE MALEATE 5 MG/1
5 TABLET ORAL EVERY 6 HOURS PRN
Status: DISCONTINUED | OUTPATIENT
Start: 2024-10-22 | End: 2024-10-31 | Stop reason: HOSPADM

## 2024-10-22 RX ORDER — HEPARIN SODIUM 5000 [USP'U]/.5ML
5000 INJECTION, SOLUTION INTRAVENOUS; SUBCUTANEOUS EVERY 8 HOURS
Status: DISCONTINUED | OUTPATIENT
Start: 2024-10-23 | End: 2024-10-29

## 2024-10-22 RX ORDER — MAGNESIUM HYDROXIDE 1200 MG/15ML
LIQUID ORAL PRN
Status: DISCONTINUED | OUTPATIENT
Start: 2024-10-22 | End: 2024-10-22 | Stop reason: HOSPADM

## 2024-10-22 RX ORDER — LIDOCAINE HYDROCHLORIDE 10 MG/ML
INJECTION, SOLUTION INFILTRATION; PERINEURAL PRN
Status: DISCONTINUED | OUTPATIENT
Start: 2024-10-22 | End: 2024-10-22

## 2024-10-22 RX ORDER — DEXTROSE MONOHYDRATE 25 G/50ML
25-50 INJECTION, SOLUTION INTRAVENOUS
Status: DISCONTINUED | OUTPATIENT
Start: 2024-10-22 | End: 2024-10-31 | Stop reason: HOSPADM

## 2024-10-22 RX ORDER — LIDOCAINE 40 MG/G
CREAM TOPICAL
Status: DISCONTINUED | OUTPATIENT
Start: 2024-10-22 | End: 2024-10-22 | Stop reason: HOSPADM

## 2024-10-22 RX ORDER — HYDROMORPHONE HYDROCHLORIDE 2 MG/1
2 TABLET ORAL
Status: DISCONTINUED | OUTPATIENT
Start: 2024-10-22 | End: 2024-10-31 | Stop reason: HOSPADM

## 2024-10-22 RX ORDER — ONDANSETRON 4 MG/1
4 TABLET, ORALLY DISINTEGRATING ORAL EVERY 6 HOURS PRN
Status: DISCONTINUED | OUTPATIENT
Start: 2024-10-22 | End: 2024-10-31 | Stop reason: HOSPADM

## 2024-10-22 RX ORDER — CEFAZOLIN SODIUM/WATER 2 G/20 ML
2 SYRINGE (ML) INTRAVENOUS
Status: COMPLETED | OUTPATIENT
Start: 2024-10-22 | End: 2024-10-22

## 2024-10-22 RX ORDER — CALCIUM CARBONATE 500 MG/1
500 TABLET, CHEWABLE ORAL 3 TIMES DAILY PRN
Status: DISCONTINUED | OUTPATIENT
Start: 2024-10-22 | End: 2024-10-31 | Stop reason: HOSPADM

## 2024-10-22 RX ORDER — POLYETHYLENE GLYCOL 3350 17 G/17G
17 POWDER, FOR SOLUTION ORAL DAILY
Status: CANCELLED | OUTPATIENT
Start: 2024-10-23

## 2024-10-22 RX ORDER — ONDANSETRON 2 MG/ML
INJECTION INTRAMUSCULAR; INTRAVENOUS PRN
Status: DISCONTINUED | OUTPATIENT
Start: 2024-10-22 | End: 2024-10-22

## 2024-10-22 RX ORDER — OXYCODONE HYDROCHLORIDE 5 MG/1
10 TABLET ORAL EVERY 4 HOURS PRN
Status: DISCONTINUED | OUTPATIENT
Start: 2024-10-22 | End: 2024-10-22

## 2024-10-22 RX ORDER — GLYCOPYRROLATE 0.2 MG/ML
INJECTION, SOLUTION INTRAMUSCULAR; INTRAVENOUS PRN
Status: DISCONTINUED | OUTPATIENT
Start: 2024-10-22 | End: 2024-10-22

## 2024-10-22 RX ORDER — NICOTINE POLACRILEX 4 MG
15-30 LOZENGE BUCCAL
Status: DISCONTINUED | OUTPATIENT
Start: 2024-10-22 | End: 2024-10-31 | Stop reason: HOSPADM

## 2024-10-22 RX ORDER — HYDROMORPHONE HCL IN WATER/PF 6 MG/30 ML
0.2 PATIENT CONTROLLED ANALGESIA SYRINGE INTRAVENOUS
Status: DISCONTINUED | OUTPATIENT
Start: 2024-10-22 | End: 2024-10-24

## 2024-10-22 RX ORDER — HYDRALAZINE HYDROCHLORIDE 20 MG/ML
10 INJECTION INTRAMUSCULAR; INTRAVENOUS EVERY 6 HOURS PRN
Status: DISCONTINUED | OUTPATIENT
Start: 2024-10-22 | End: 2024-10-25

## 2024-10-22 RX ORDER — OXYCODONE HYDROCHLORIDE 5 MG/1
5 TABLET ORAL EVERY 4 HOURS PRN
Status: DISCONTINUED | OUTPATIENT
Start: 2024-10-22 | End: 2024-10-22

## 2024-10-22 RX ADMIN — HYDROMORPHONE HYDROCHLORIDE 0.2 MG: 0.2 INJECTION, SOLUTION INTRAMUSCULAR; INTRAVENOUS; SUBCUTANEOUS at 16:52

## 2024-10-22 RX ADMIN — DEXAMETHASONE SODIUM PHOSPHATE 10 MG: 10 INJECTION, SOLUTION INTRAMUSCULAR; INTRAVENOUS at 08:09

## 2024-10-22 RX ADMIN — PHENYLEPHRINE HYDROCHLORIDE 0.2 MCG/KG/MIN: 10 INJECTION INTRAVENOUS at 09:24

## 2024-10-22 RX ADMIN — HYDRALAZINE HYDROCHLORIDE 10 MG: 20 INJECTION INTRAMUSCULAR; INTRAVENOUS at 18:58

## 2024-10-22 RX ADMIN — PROPOFOL 140 MG: 10 INJECTION, EMULSION INTRAVENOUS at 08:09

## 2024-10-22 RX ADMIN — PHENYLEPHRINE HYDROCHLORIDE 50 MCG: 10 INJECTION INTRAVENOUS at 08:46

## 2024-10-22 RX ADMIN — KETAMINE HYDROCHLORIDE 50 MG: 10 INJECTION INTRAMUSCULAR; INTRAVENOUS at 08:09

## 2024-10-22 RX ADMIN — PHENYLEPHRINE HYDROCHLORIDE 100 MCG: 10 INJECTION INTRAVENOUS at 08:41

## 2024-10-22 RX ADMIN — Medication 2 G: at 12:15

## 2024-10-22 RX ADMIN — PHENYLEPHRINE HYDROCHLORIDE 100 MCG: 10 INJECTION INTRAVENOUS at 08:22

## 2024-10-22 RX ADMIN — GLYCOPYRROLATE 0.2 MG: 0.2 INJECTION INTRAMUSCULAR; INTRAVENOUS at 08:46

## 2024-10-22 RX ADMIN — HYDROMORPHONE HYDROCHLORIDE 0.5 MG: 1 INJECTION, SOLUTION INTRAMUSCULAR; INTRAVENOUS; SUBCUTANEOUS at 15:57

## 2024-10-22 RX ADMIN — HYDROMORPHONE HYDROCHLORIDE 0.4 MG: 0.2 INJECTION, SOLUTION INTRAMUSCULAR; INTRAVENOUS; SUBCUTANEOUS at 20:19

## 2024-10-22 RX ADMIN — SODIUM CHLORIDE, POTASSIUM CHLORIDE, SODIUM LACTATE AND CALCIUM CHLORIDE: 600; 310; 30; 20 INJECTION, SOLUTION INTRAVENOUS at 17:38

## 2024-10-22 RX ADMIN — PANTOPRAZOLE SODIUM 40 MG: 40 TABLET, DELAYED RELEASE ORAL at 19:01

## 2024-10-22 RX ADMIN — SODIUM CHLORIDE, POTASSIUM CHLORIDE, SODIUM LACTATE AND CALCIUM CHLORIDE: 600; 310; 30; 20 INJECTION, SOLUTION INTRAVENOUS at 12:50

## 2024-10-22 RX ADMIN — ROCURONIUM BROMIDE 10 MG: 50 INJECTION, SOLUTION INTRAVENOUS at 08:48

## 2024-10-22 RX ADMIN — PHENYLEPHRINE HYDROCHLORIDE 50 MCG: 10 INJECTION INTRAVENOUS at 08:48

## 2024-10-22 RX ADMIN — FENTANYL CITRATE 50 MCG: 50 INJECTION INTRAMUSCULAR; INTRAVENOUS at 14:08

## 2024-10-22 RX ADMIN — ROCURONIUM BROMIDE 20 MG: 50 INJECTION, SOLUTION INTRAVENOUS at 08:52

## 2024-10-22 RX ADMIN — LIDOCAINE HYDROCHLORIDE 5 ML: 10 INJECTION, SOLUTION INFILTRATION; PERINEURAL at 08:08

## 2024-10-22 RX ADMIN — ONDANSETRON 4 MG: 4 TABLET, ORALLY DISINTEGRATING ORAL at 20:31

## 2024-10-22 RX ADMIN — ALBUMIN HUMAN: 0.05 INJECTION, SOLUTION INTRAVENOUS at 11:02

## 2024-10-22 RX ADMIN — PHENYLEPHRINE HYDROCHLORIDE 100 MCG: 10 INJECTION INTRAVENOUS at 08:29

## 2024-10-22 RX ADMIN — HYDROMORPHONE HYDROCHLORIDE 1 MG: 1 INJECTION, SOLUTION INTRAMUSCULAR; INTRAVENOUS; SUBCUTANEOUS at 08:08

## 2024-10-22 RX ADMIN — ROCURONIUM BROMIDE 70 MG: 50 INJECTION, SOLUTION INTRAVENOUS at 08:09

## 2024-10-22 RX ADMIN — FENTANYL CITRATE 50 MCG: 50 INJECTION INTRAMUSCULAR; INTRAVENOUS at 10:31

## 2024-10-22 RX ADMIN — GLYCOPYRROLATE 0.2 MG: 0.2 INJECTION INTRAMUSCULAR; INTRAVENOUS at 08:51

## 2024-10-22 RX ADMIN — ROCURONIUM BROMIDE 20 MG: 50 INJECTION, SOLUTION INTRAVENOUS at 11:25

## 2024-10-22 RX ADMIN — PHENYLEPHRINE HYDROCHLORIDE 100 MCG: 10 INJECTION INTRAVENOUS at 08:26

## 2024-10-22 RX ADMIN — SUGAMMADEX 200 MG: 100 INJECTION, SOLUTION INTRAVENOUS at 14:20

## 2024-10-22 RX ADMIN — FENTANYL CITRATE 50 MCG: 50 INJECTION INTRAMUSCULAR; INTRAVENOUS at 14:11

## 2024-10-22 RX ADMIN — FENTANYL CITRATE 50 MCG: 50 INJECTION INTRAMUSCULAR; INTRAVENOUS at 10:58

## 2024-10-22 RX ADMIN — HYDROMORPHONE HYDROCHLORIDE 0.2 MG: 0.2 INJECTION, SOLUTION INTRAMUSCULAR; INTRAVENOUS; SUBCUTANEOUS at 15:00

## 2024-10-22 RX ADMIN — HYDROMORPHONE HYDROCHLORIDE 0.4 MG: 0.2 INJECTION, SOLUTION INTRAMUSCULAR; INTRAVENOUS; SUBCUTANEOUS at 17:44

## 2024-10-22 RX ADMIN — PHENYLEPHRINE HYDROCHLORIDE 100 MCG: 10 INJECTION INTRAVENOUS at 08:20

## 2024-10-22 RX ADMIN — MAGNESIUM SULFATE HEPTAHYDRATE 4 G: 80 INJECTION, SOLUTION INTRAVENOUS at 06:55

## 2024-10-22 RX ADMIN — ROCURONIUM BROMIDE 20 MG: 50 INJECTION, SOLUTION INTRAVENOUS at 10:15

## 2024-10-22 RX ADMIN — ALBUMIN HUMAN: 0.05 INJECTION, SOLUTION INTRAVENOUS at 08:24

## 2024-10-22 RX ADMIN — DEXMEDETOMIDINE HYDROCHLORIDE 8 MCG: 100 INJECTION, SOLUTION INTRAVENOUS at 14:11

## 2024-10-22 RX ADMIN — ROCURONIUM BROMIDE 30 MG: 50 INJECTION, SOLUTION INTRAVENOUS at 11:32

## 2024-10-22 RX ADMIN — ACETAMINOPHEN 975 MG: 325 TABLET ORAL at 06:18

## 2024-10-22 RX ADMIN — ACETAMINOPHEN 975 MG: 325 TABLET ORAL at 19:01

## 2024-10-22 RX ADMIN — ROCURONIUM BROMIDE 10 MG: 50 INJECTION, SOLUTION INTRAVENOUS at 08:47

## 2024-10-22 RX ADMIN — Medication 2 G: at 08:16

## 2024-10-22 RX ADMIN — MIDAZOLAM 2 MG: 1 INJECTION INTRAMUSCULAR; INTRAVENOUS at 07:49

## 2024-10-22 RX ADMIN — HYDROMORPHONE HYDROCHLORIDE 0.5 MG: 1 INJECTION, SOLUTION INTRAMUSCULAR; INTRAVENOUS; SUBCUTANEOUS at 15:21

## 2024-10-22 RX ADMIN — HEPARIN SODIUM 5000 UNITS: 5000 INJECTION, SOLUTION INTRAVENOUS; SUBCUTANEOUS at 08:06

## 2024-10-22 RX ADMIN — HYDROMORPHONE HYDROCHLORIDE 0.5 MG: 1 INJECTION, SOLUTION INTRAMUSCULAR; INTRAVENOUS; SUBCUTANEOUS at 15:10

## 2024-10-22 RX ADMIN — ROCURONIUM BROMIDE 20 MG: 50 INJECTION, SOLUTION INTRAVENOUS at 08:36

## 2024-10-22 RX ADMIN — SODIUM CHLORIDE, POTASSIUM CHLORIDE, SODIUM LACTATE AND CALCIUM CHLORIDE: 600; 310; 30; 20 INJECTION, SOLUTION INTRAVENOUS at 20:22

## 2024-10-22 RX ADMIN — ONDANSETRON 4 MG: 2 INJECTION INTRAMUSCULAR; INTRAVENOUS at 14:08

## 2024-10-22 RX ADMIN — DEXMEDETOMIDINE HYDROCHLORIDE 12 MCG: 100 INJECTION, SOLUTION INTRAVENOUS at 08:08

## 2024-10-22 RX ADMIN — SODIUM CHLORIDE, POTASSIUM CHLORIDE, SODIUM LACTATE AND CALCIUM CHLORIDE: 600; 310; 30; 20 INJECTION, SOLUTION INTRAVENOUS at 07:49

## 2024-10-22 ASSESSMENT — ACTIVITIES OF DAILY LIVING (ADL)
ADLS_ACUITY_SCORE: 20
ADLS_ACUITY_SCORE: 21
ADLS_ACUITY_SCORE: 31
ADLS_ACUITY_SCORE: 20
ADLS_ACUITY_SCORE: 22
ADLS_ACUITY_SCORE: 20
ADLS_ACUITY_SCORE: 22
ADLS_ACUITY_SCORE: 20
ADLS_ACUITY_SCORE: 20
ADLS_ACUITY_SCORE: 22
ADLS_ACUITY_SCORE: 21
ADLS_ACUITY_SCORE: 22
ADLS_ACUITY_SCORE: 20
ADLS_ACUITY_SCORE: 22

## 2024-10-22 ASSESSMENT — LIFESTYLE VARIABLES: TOBACCO_USE: 1

## 2024-10-22 NOTE — ANESTHESIA PROCEDURE NOTES
Airway       Patient location during procedure: OR       Procedure Start/Stop Times: 10/22/2024 8:13 AM  Staff -        CRNA: Dee Dee Pelletier APRN CRNA       Performed By: CRNA  Consent for Airway        Urgency: elective  Indications and Patient Condition       Indications for airway management: lorene-procedural       Induction type:intravenous       Mask difficulty assessment: 2 - vent by mask + OA or adjuvant +/- NMBA (facial hair and large head)    Final Airway Details       Final airway type: endotracheal airway       Successful airway: ETT - single  Endotracheal Airway Details        ETT size (mm): 8.0       Cuffed: yes       Cuff volume (mL): 5       Successful intubation technique: direct laryngoscopy       DL Blade Type: MAC 3       Grade View of Cords: 2 (2b base of arytenoids with cricoid and head lift)       Adjucts: stylet       Position: Right       Measured from: gums/teeth       Secured at (cm): 24       Bite block used: Soft    Post intubation assessment        Placement verified by: capnometry, equal breath sounds and chest rise        Number of attempts at approach: 1       Number of other approaches attempted: 0       Secured with: tape       Ease of procedure: easy       Dentition: Intact    Medication(s) Administered   Medication Administration Time: 10/22/2024 8:13 AM

## 2024-10-22 NOTE — PROGRESS NOTES
RT called to room to assess patient for nocturnal CPAP. Patient has a history of KRYS and has CPAP at home. Spouse states patient not compliant due for tolerance. Patient agrees to try hospital CPAP tonight and spouse will bring CPAP from home. ETCO2 will need to be removed before CPAP placement.   RN notified and to reach out to MD for CPAP orders.     RT to follow.     Mariam Alegre, RT

## 2024-10-22 NOTE — INTERVAL H&P NOTE
"I have reviewed the surgical (or preoperative) H&P that is linked to this encounter, and examined the patient. There are no significant changes    Clinical Conditions Present on Arrival:  Clinically Significant Risk Factors Present on Admission                       # Obesity: Estimated body mass index is 32.6 kg/m  as calculated from the following:    Height as of this encounter: 1.765 m (5' 9.5\").    Weight as of this encounter: 101.6 kg (224 lb).     hDarmesh Lynch MD  [facility]  (p) [phone]    "

## 2024-10-22 NOTE — ANESTHESIA POSTPROCEDURE EVALUATION
Patient: Lobito Zheng    Procedure: Procedure(s):  ROBOTIC ASSISTED LAPAROSCOPIC CYSTOPROSTATECTOMY WITH BILATERAL PELVIC LYMPH NODE DISSECTION AND ILEAL CONDUIT URINARY  DIVERSION       Anesthesia Type:  General    Note:  Disposition: Inpatient   Postop Pain Control: Uneventful            Sign Out: Well controlled pain   PONV: No   Neuro/Psych: Uneventful            Sign Out: Acceptable/Baseline neuro status   Airway/Respiratory: Uneventful            Sign Out: Acceptable/Baseline resp. status   CV/Hemodynamics: Uneventful            Sign Out: Acceptable CV status   Other NRE:    DID A NON-ROUTINE EVENT OCCUR?            Last vitals:  Vitals Value Taken Time   /94 10/22/24 1616   Temp 36.8  C (98.24  F) 10/22/24 1623   Pulse 84 10/22/24 1623   Resp 18 10/22/24 1623   SpO2 98 % 10/22/24 1623   Vitals shown include unfiled device data.    Electronically Signed By: Fadi Fischer MD  October 22, 2024  4:25 PM

## 2024-10-22 NOTE — ANESTHESIA CARE TRANSFER NOTE
Patient: Lobito Zheng    Procedure: Procedure(s):  ROBOTIC ASSISTED LAPAROSCOPIC CYSTOPROSTATECTOMY WITH BILATERAL PELVIC LYMPH NODE DISSECTION AND ILEAL CONDUIT URINARY  DIVERSION       Diagnosis: Malignant neoplasm of urinary bladder (H) [C67.9]  Diagnosis Additional Information: No value filed.    Anesthesia Type:   General     Note:    Oropharynx: oropharynx clear of all foreign objects and spontaneously breathing  Level of Consciousness: awake  Oxygen Supplementation: face mask  Level of Supplemental Oxygen (L/min / FiO2): 8  Independent Airway: airway patency satisfactory and stable  Dentition: dentition unchanged  Vital Signs Stable: post-procedure vital signs reviewed and stable  Report to RN Given: handoff report given  Patient transferred to: PACU    Handoff Report: Identifed the Patient, Identified the Reponsible Provider, Reviewed the pertinent medical history, Discussed the surgical course, Reviewed Intra-OP anesthesia mangement and issues during anesthesia, Set expectations for post-procedure period and Allowed opportunity for questions and acknowledgement of understanding  Vitals:  Vitals Value Taken Time   /74 10/22/24 1445   Temp 37.7  C (99.86  F) 10/22/24 1445   Pulse 77 10/22/24 1445   Resp 15 10/22/24 1445   SpO2 99 % 10/22/24 1445   Vitals shown include unfiled device data.    Electronically Signed By: KIKE Hester CRNA  October 22, 2024  2:47 PM

## 2024-10-22 NOTE — INTERVAL H&P NOTE
"I have reviewed the surgical (or preoperative) H&P that is linked to this encounter, and examined the patient. There are no significant changes    Clinical Conditions Present on Arrival:  Clinically Significant Risk Factors Present on Admission                       # Obesity: Estimated body mass index is 32.6 kg/m  as calculated from the following:    Height as of this encounter: 1.765 m (5' 9.5\").    Weight as of this encounter: 101.6 kg (224 lb).     Dharmesh Lynch MD  [facility]  (p) [phone]    "

## 2024-10-22 NOTE — OP NOTE
Operative Report    Date of Surgery: 10/22/24    Location of Service: [facility]    Preoperative Diagnosis:  Urothelial cell carcinoma    Postoperative Diagnosis:  Urothelial cell carcinoma    Procedure:  Robotic-assisted laparoscopic radical cystoprostatectomy  Bilateral pelvic lymphadenectomy  Ileal conduit urinary diversion    Surgeons:  MD Alejandro Barrett MD, FACS. I asked Dr. Farrar to assisted given their expertise with minimally invasive robotic surgery    Estimated Blood Loss: 75 mL    Specimens:  1. Bladder and prostate and seminal vesicles  2. Right pelvic lymph nodes  3. Left pelvic lymph nodes  4. Right distal ureteral margin  5. Left distal ureteral margin    Drains:  Lombardo catheter  19 mm Blayne drain  Bilateral single-J ureteral stents    Findings:  Right and left distal ureteral margins sent for frozen section returned negative for any atypia.  Visually negative margins for bilateral pelvic lymph nodes in the bladder as well as prostate and seminal vesicles.  Pre-existing right ureteral stent was removed.  Unremarkable ileal conduit urinary diversion.    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  He underwent transurethral section of bladder tumor outside hospital and required right ureteral stent insertion due to involvement of the right ureteral orifice.  He subsequently underwent a course of neoadjuvant chemotherapy.  He now presents for radical cystectomy with ileal conduit urinary diversion.  After discussion of risks, benefits, and alternatives, the patient agreed to undergo the above-stated procedure for definitive management of his bladder cancer.    Description of Procedure:  After appropriate informed consent was obtained the patient was brought to the operating theater. He was laid in the supine position. General anesthesia was induced. Perioperative antibiotics  were administered. The patient was then placed in the dorsal lithotomy position and prepared and draped in the usual sterile fashion. A Lombardo catheter was inserted and placed to straight gravity drainage. A time out was performed.    Insufflation to the peritoneum was established using the Veress needle technique just below the umbilicus. Once pneumoperitoneum was established access to the abdomen was gained through use of a 8 mm port under direct vision. The abdomen was surveyed and there were no significant adhesions. Two additional 8 mm robotic ports were placed under vision in the left lower quadrant. An additional 8 mm robotic port was placed in the right lower quadrant and a 12 mm assistant port. An additional 5 mm assistant port was placed in the right upper quadrant. The patient was then placed in 30 degrees of Trendelenburg position and the Da Oscar robotic platform was docked. The instruments were then brought into the field under visual guidance.    I first turned my attention to the adhesions of the sigmoid colon. These were released sharply until the sigmoid was mobilized. I then traced the vas deferens medially to where I felt the seminal vesicles would be located. The posterior peritoneum was incised and a plane was developed. The Denonvier's fascia was entered and a plane between the rectum and the prostate was developed. This was carried to the level of the prostatic apex and also developed laterally.    The right medial umbilical ligament was identified. I incised the peritoneum lateral to this and carried my dissection down along the ligament to the level of the internal iliac artery. The right medial umbilical ligament was divided. The peritoneum was then opened and carried cephalad to expose the right ureter. I carefully dissected the ureter free using sharp dissection. Great care was taken to not directly manipulate the ureter and sparing use of cautery was used. The right ureter was mobilized  up to the level of the proximal ureter and distally down to where the ureter enters the intramural tunnel. This was then transected in doing so revealed the patient's right ureteral stent.  The stent was grasped and removed from the upper tract through one of the port sites and discarded.  The distal portion of the stent after transection remained within the bladder specimen. The clip across the right distal ureter was left with a 2-0 silk tag to facilitate future identification during the urinary diversion creation. A frozen section of the right ureter was sent off for pathologic analysis. This exact process was then completed on the left. The frozen sections both returned negative for malignancy.    I then proceeded to develop the space of Retzius down to the level of the endopelvic fascia bilaterally. The endopelvic fascia was then entered and the muscle was swept laterally and distally. This created a nice view of the bladder and prostatic pedicles. These were subsequently divided on each side between clips and using the Enseal device.    I then proceeded to incise the urachus and the bladder was fully released from the anterior abdominal wall. The prostate was defatted and the dorsal venous complex was identified. The dorsal venous complex was then controlled with a 0 Vicryl suture. The dorsal venous complex was then divided with electrocautery. The urethra was then skeletonized. The Lombardo catheter was withdrawn. The urethra was then clipped proximally and divided to ensure no spillage of urine. The bladder, prostate, and seminal vesicles were placed in an Endo Catch bag.    I then proceeded with a standard template bilateral pelvic lymphadenectomy. This included the internal and external iliac node packets and the obturator-hypogastric nerve packets. These were sent on the right and left side respectively.    Hemostasis was then achieved.  Lastly we closed the urethral utilizing a simple running 2-0 Vicryl  suture.  The midline 8 mm port was extended inferiorly. The specimens were extracted. The robot was then undocked.    I then turned my attention to the terminal ileus. A 10 cm segment of the ileum was identified approximal 15 cm proximal to the ileocecal junction. I first transilluminated the mesentery and then divided this at the proximal and distal segments. The ileum was divided using a BRITTNEE stapler proximally and distally. Bowel continuity was then re-established in a side to side fashion using the BRITTNEE stapler. The anastomosis was reinforced using 3-0 silk Lambert sutures.    I then turned my attention to the butt end of the conduit. The locations for the ureteral anastamoses were identified the ureters easily reached these areas without tension. The right ureter was spatulated and the posterior aspect of the ureteroenteric anastomosis was accomplished using 4-0 interrupted Vicryl suture. A single J stent was then placed with return of urine. The anterior aspect of the anastomosis was then completed. This exact procedure was accomplished on the left. The ureteral stents were secured to the conduit using an interrupted 5-0 chromic suture.    The stoma site marking in the right lower quadrant was identified. A circular skin incision was created and the adipose tissue and skin were dissected free. A cruciate incision was made in the fascia and widened to two fingers breadths. The distal end of the conduit was then brought through the fascia. The conduit was then secured to the fascia at three points taking care not to include the mesentery. The stoma was then mature using interrupted 2-0 Vicryl suture. A 19 Greek Blayne drain was brought in through the left lower quadrant 8 mm robotic port and positioned in the pelvis. This was secured in place using a 2-0 silk suture.    The midline fascia was closed using a 0 looped PDS suture in a running fashion.  A 2-0 Vicryl suture was used to cover the PDS knot with  subcutaneous tissue.  The skin of the extraction incision was closed utilizing staples.  The 12 mm assistant port was then closed using 0 Vicryl suture in an interrupted fashion.  Local anesthetic was injected at all incision sites. The skin incisions of the port sites were closed using staples. An appliance was fitted to the stoma. Sterile dressings were applied to all incisions.    This concluded the procedure. The patient was awoken from anesthesia and brought to the recovery room in satisfactory condition.    Dharmesh Lynch MD  [facility]  p) [phone]

## 2024-10-22 NOTE — ANESTHESIA PREPROCEDURE EVALUATION
Anesthesia Pre-Procedure Evaluation    Patient: Lobito Zheng   MRN: [id number] : 1955        Procedure : Procedure(s):  ROBOTIC ASSISTED LAPAROSCOPIC CYSTOPROSTATECTOMY WITH BILATERAL PELVIC LYMPH NODE DISSECTION AND EXTRACORPOREAL ILEAL CONDUIT URINARY  DIVERSION          Past Medical History:   Diagnosis Date    Acid reflux     Cancer of ureteric orifice (H)     Diabetes mellitus (H)     Hyperlipidemia     Hypertension     Sleep apnea     Urothelial carcinoma (H)       Past Surgical History:   Procedure Laterality Date    COLONOSCOPY W/ POLYPECTOMY      COLONSOCOPY BIOPSIES AND POLYPECTOMY      CYSTOSCOPY, TRANSURETHRAL RESECTION (TUR) TUMOR BLADDER, COMBINED      LARGE BLADDER TUMOR AND RIGHT URTERAL STENT PLACEMENT    FRACTURE SURGERY        No Known Allergies   Social History     Tobacco Use    Smoking status: Former     Types: Cigarettes    Smokeless tobacco: Never   Substance Use Topics    Alcohol use: Not Currently     Comment: quit drinking in 2024      Wt Readings from Last 1 Encounters:   10/22/24 101.6 kg (224 lb)        Anesthesia Evaluation   Pt has had prior anesthetic.     No history of anesthetic complications       ROS/MED HX  ENT/Pulmonary:     (+) sleep apnea (noncompliant), doesn't use CPAP,              tobacco use, Past use,                       Neurologic:  - neg neurologic ROS     Cardiovascular:     (+)  hypertension- -   -  - -                                      METS/Exercise Tolerance: >4 METS    Hematologic:       Musculoskeletal:       GI/Hepatic:     (+) GERD, Asymptomatic on medication,                  Renal/Genitourinary:       Endo:     (+)  type II DM,   Not using insulin,          Obesity,       Psychiatric/Substance Use:  - neg psychiatric ROS     Infectious Disease:       Malignancy:   (+) Malignancy, History of Other.Other CA Active status post.    Other:            Physical Exam    Airway        Mallampati: III   TM distance: > 3 FB   Neck ROM: full   Mouth  "opening: > 3 cm    Respiratory Devices and Support         Dental       (+) Modest Abnormalities - crowns, retainers, 1 or 2 missing teeth      Cardiovascular             Pulmonary           breath sounds clear to auscultation           OUTSIDE LABS:  CBC: No results found for: \"WBC\", \"HGB\", \"HCT\", \"PLT\"  BMP:   Lab Results   Component Value Date    GLC 91 10/22/2024     COAGS: No results found for: \"PTT\", \"INR\", \"FIBR\"  POC: No results found for: \"BGM\", \"HCG\", \"HCGS\"  HEPATIC: No results found for: \"ALBUMIN\", \"PROTTOTAL\", \"ALT\", \"AST\", \"GGT\", \"ALKPHOS\", \"BILITOTAL\", \"BILIDIRECT\", \"TACOS\"  OTHER: No results found for: \"PH\", \"LACT\", \"A1C\", \"WENCESLAO\", \"PHOS\", \"MAG\", \"LIPASE\", \"AMYLASE\", \"TSH\", \"T4\", \"T3\", \"CRP\", \"SED\"    Anesthesia Plan    ASA Status:  3    NPO Status:  NPO Appropriate    Anesthesia Type: General.     - Airway: ETT   Induction: Intravenous, Propofol.   Maintenance: Balanced.   Techniques and Equipment:     - Airway: Video-Laryngoscope       Consents    Anesthesia Plan(s) and associated risks, benefits, and realistic alternatives discussed. Questions answered and patient/representative(s) expressed understanding.     - Discussed:     - Discussed with:  Patient       Use of blood products discussed: Yes.     - Discussed with: Patient.     - Consented: consented to blood products     Postoperative Care    Pain management: Multi-modal analgesia.   PONV prophylaxis: Ondansetron (or other 5HT-3), Dexamethasone or Solumedrol, Background Propofol Infusion     Comments:    Other Comments:     Dilaudid on induction  Mag gtt  Ketamine  Robinul  Precedex boluses prn                   Jennifer Desir MD                         # Obesity: Estimated body mass index is 32.6 kg/m  as calculated from the following:    Height as of this encounter: 1.765 m (5' 9.5\").    Weight as of this encounter: 101.6 kg (224 lb).             "

## 2024-10-22 NOTE — CONSULTS
[facility]  Consult Note - Hospitalist Service  Date of Admission:  10/22/2024  Consult Requested by: Urology  Reason for Consult: Medical co-management    Assessment & Plan   Assessment:  Lobito Zheng is a 69 year old male with a past medical history documented below presented to the hospital for a robotic assisted laparoscopic cystoprostatectomy with bilateral pelvic lymph node dissection and extracorporeal ileal conduit urinary diversion and is s/p procedure, postprocedure medicine was consulted for medical comanagement.    Problem list and plan:  Urothelial carcinoma  Presented to the hospital for robotic assisted laparoscopic cystoprostatectomy with bilateral pelvic lymph node dissection and extracorporeal ileal conduit urinary diversion  Urology following, follow-up recommendations  Continue with pain management as per protocol  Continue with amoxicillin to prevent bladder infection along with cranberry and tamsulosin once n.p.o. status is lifted  Surgical pathology report pending    VAMSHI on CKD stage III  Possibly a combination of prerenal in postrenal etiology  Baseline creatinine around 1.2  Current creatinine 1.48  Gentle IV hydration  Monitor renal function closely    History of non-insulin-dependent type 2 diabetes mellitus with hyperglycemia  History of hyperlipidemia  Monitor blood sugar level  Hypoglycemia protocol  Continue sliding scale  Holding metformin for now  Follow-up hemoglobin A1c  Continue with rosuvastatin once n.p.o. status is lifted    Obstructive sleep apnea  Continue CPAP    Anemia and thrombocytopenia  Monitor CBC, follow-up iron panel, folate and vitamin B12 levels    Elevated blood pressure without a diagnosis of hypertension  Monitor vital signs as per protocol  IV hydralazine as needed for elevated blood pressure    DVT PPx  Continue with heparin subcutaneous every 8     Clinically Significant Risk Factors Present on Admission                      #  "Anemia: based on hgb <11       # Obesity: Estimated body mass index is 33.86 kg/m  as calculated from the following:    Height as of this encounter: 1.753 m (5' 9\").    Weight as of this encounter: 104 kg (229 lb 4.5 oz).              Saad J. Gondal, MD  Hospitalist Service  Securely message with Vocera (more info)  Text page via [facility] Paging/Directory   ______________________________________________________________________    Chief Complaint   Urothelial carcinoma    History is obtained from the patient and chart review    History of Present Illness   Lobito Zheng is a 69 year old male with a past medical history documented below presented to the hospital for a robotic assisted laparoscopic cystoprostatectomy with bilateral pelvic lymph node dissection and extracorporeal ileal conduit urinary diversion and is s/p procedure, postprocedure medicine was consulted for medical comanagement.      Past Medical History    Past Medical History:   Diagnosis Date    Acid reflux     Cancer of ureteric orifice (H)     Diabetes mellitus (H)     Hyperlipidemia     Hypertension     Sleep apnea     Urothelial carcinoma (H)        Past Surgical History   Past Surgical History:   Procedure Laterality Date    COLONOSCOPY W/ POLYPECTOMY      COLONSOCOPY BIOPSIES AND POLYPECTOMY      CYSTOSCOPY, TRANSURETHRAL RESECTION (TUR) TUMOR BLADDER, COMBINED      LARGE BLADDER TUMOR AND RIGHT URTERAL STENT PLACEMENT    FRACTURE SURGERY         Medications   I have reviewed this patient's current medications       Review of Systems    The 10 point Review of Systems is negative   Physical Exam   Vital Signs: Temp: 97.6  F (36.4  C) Temp src: Oral BP: (!) 156/85 Pulse: 80   Resp: 20 SpO2: 95 % O2 Device: Nasal cannula Oxygen Delivery: 3 LPM  Weight: 229 lbs 4.45 oz    GENERAL: Patient was seen and examined at bedside with no acute distress  HENT:  Head is normocephalic, atraumatic.   EYES:  Eyes have anicteric sclerae without conjunctival " injection   LUNGS: Bilateral air entry, clear to auscultation bilaterally  CARDIOVASCULAR:  Regular rate and rhythm with no murmurs, gallops or rubs.  ABDOMEN:  Normal bowel sounds. Soft; nontender   EXT: no edema    SKIN:  No acute rashes.   NEUROLOGIC:  Grossly nonfocal.      Medical Decision Making       80 MINUTES SPENT BY ME on the date of service doing chart review, history, exam, documentation & further activities per the note.      Data     I have personally reviewed the following data over the past 24 hrs:    N/A  \   10.6 (L)   / 143 (L)     140 104 20.3 /  181 (H)   4.6 22 1.48 (H) \       Imaging results reviewed over the past 24 hrs:   No results found for this or any previous visit (from the past 24 hour(s)).

## 2024-10-22 NOTE — PHARMACY-ADMISSION MEDICATION HISTORY
Pharmacist Admission Medication History    Admission medication history is complete. The information provided in this note is only as accurate as the sources available at the time of the update.    Information Source(s): Patient via in-person    Pertinent Information:   Patient had medication list bedside and I confirmed the dose increases of metformin 500 mg BID -> 1000 mg BID and rosuvastatin 20 mg -> 40 mg    Changes made to PTA medication list:  Added: None  Deleted: Ondansetron, prochlorperazine, sennosides  Changed: Metformin, rosuvastatin (see above)    Allergies reviewed with patient and updates made in EHR: yes    Medication History Completed By: Basim Galan [facility] 10/22/2024 6:58 AM    PTA Med List   Medication Sig Last Dose    acetaminophen (TYLENOL) 500 MG tablet Take 1,000 mg by mouth every 6 hours as needed for mild pain. Past Week at PRN    amoxicillin (AMOXIL) 500 MG capsule Take 500 mg by mouth daily. To prevent bladder infections 10/21/2024 at AM    Cranberry 500 MG TABS Take 500 mg by mouth daily. 10/21/2024 at AM    Melatonin 12 MG TABS Take 12 mg by mouth at bedtime. 10/20/2024 at HS    metFORMIN (GLUCOPHAGE) 1000 MG tablet Take 1,000 mg by mouth 2 times daily (with meals). 10/20/2024 at PM    omeprazole (PRILOSEC) 20 MG DR capsule Take 20 mg by mouth daily. 10/21/2024 at AM    rosuvastatin (CRESTOR) 40 MG tablet Take 40 mg by mouth at bedtime. 10/21/2024 at HS    tamsulosin (FLOMAX) 0.4 MG capsule Take 0.8 mg by mouth daily. 10/21/2024 at HS

## 2024-10-23 ENCOUNTER — APPOINTMENT (OUTPATIENT)
Dept: OCCUPATIONAL THERAPY | Facility: HOSPITAL | Age: 69
DRG: 653 | End: 2024-10-23
Attending: STUDENT IN AN ORGANIZED HEALTH CARE EDUCATION/TRAINING PROGRAM
Payer: COMMERCIAL

## 2024-10-23 ENCOUNTER — APPOINTMENT (OUTPATIENT)
Dept: PHYSICAL THERAPY | Facility: HOSPITAL | Age: 69
DRG: 653 | End: 2024-10-23
Attending: STUDENT IN AN ORGANIZED HEALTH CARE EDUCATION/TRAINING PROGRAM
Payer: COMMERCIAL

## 2024-10-23 ENCOUNTER — APPOINTMENT (OUTPATIENT)
Dept: RADIOLOGY | Facility: HOSPITAL | Age: 69
DRG: 653 | End: 2024-10-23
Attending: STUDENT IN AN ORGANIZED HEALTH CARE EDUCATION/TRAINING PROGRAM
Payer: COMMERCIAL

## 2024-10-23 LAB
ANION GAP SERPL CALCULATED.3IONS-SCNC: 11 MMOL/L (ref 7–15)
BUN SERPL-MCNC: 20.8 MG/DL (ref 8–23)
CALCIUM SERPL-MCNC: 9.1 MG/DL (ref 8.8–10.4)
CHLORIDE SERPL-SCNC: 103 MMOL/L (ref 98–107)
CREAT SERPL-MCNC: 1.34 MG/DL (ref 0.67–1.17)
EGFRCR SERPLBLD CKD-EPI 2021: 58 ML/MIN/1.73M2
ERYTHROCYTE [DISTWIDTH] IN BLOOD BY AUTOMATED COUNT: 17.6 % (ref 10–15)
FERRITIN SERPL-MCNC: 78 NG/ML (ref 31–409)
FOLATE SERPL-MCNC: 5.4 NG/ML (ref 4.6–34.8)
GLUCOSE BLDC GLUCOMTR-MCNC: 109 MG/DL (ref 70–99)
GLUCOSE BLDC GLUCOMTR-MCNC: 113 MG/DL (ref 70–99)
GLUCOSE BLDC GLUCOMTR-MCNC: 118 MG/DL (ref 70–99)
GLUCOSE BLDC GLUCOMTR-MCNC: 147 MG/DL (ref 70–99)
GLUCOSE BLDC GLUCOMTR-MCNC: 96 MG/DL (ref 70–99)
GLUCOSE SERPL-MCNC: 122 MG/DL (ref 70–99)
HCO3 SERPL-SCNC: 25 MMOL/L (ref 22–29)
HCT VFR BLD AUTO: 31.6 % (ref 40–53)
HGB BLD-MCNC: 10 G/DL (ref 13.3–17.7)
MAGNESIUM SERPL-MCNC: 2.3 MG/DL (ref 1.7–2.3)
MCH RBC QN AUTO: 29.6 PG (ref 26.5–33)
MCHC RBC AUTO-ENTMCNC: 31.6 G/DL (ref 31.5–36.5)
MCV RBC AUTO: 94 FL (ref 78–100)
PHOSPHATE SERPL-MCNC: 2.8 MG/DL (ref 2.5–4.5)
PLATELET # BLD AUTO: 132 10E3/UL (ref 150–450)
POTASSIUM SERPL-SCNC: 4.4 MMOL/L (ref 3.4–5.3)
RBC # BLD AUTO: 3.38 10E6/UL (ref 4.4–5.9)
SODIUM SERPL-SCNC: 139 MMOL/L (ref 135–145)
VIT B12 SERPL-MCNC: 503 PG/ML (ref 232–1245)
WBC # BLD AUTO: 12.8 10E3/UL (ref 4–11)

## 2024-10-23 PROCEDURE — 250N000013 HC RX MED GY IP 250 OP 250 PS 637: Performed by: STUDENT IN AN ORGANIZED HEALTH CARE EDUCATION/TRAINING PROGRAM

## 2024-10-23 PROCEDURE — 74018 RADEX ABDOMEN 1 VIEW: CPT

## 2024-10-23 PROCEDURE — G0463 HOSPITAL OUTPT CLINIC VISIT: HCPCS

## 2024-10-23 PROCEDURE — 258N000003 HC RX IP 258 OP 636: Performed by: STUDENT IN AN ORGANIZED HEALTH CARE EDUCATION/TRAINING PROGRAM

## 2024-10-23 PROCEDURE — 120N000001 HC R&B MED SURG/OB

## 2024-10-23 PROCEDURE — 84100 ASSAY OF PHOSPHORUS: CPT | Performed by: STUDENT IN AN ORGANIZED HEALTH CARE EDUCATION/TRAINING PROGRAM

## 2024-10-23 PROCEDURE — 97166 OT EVAL MOD COMPLEX 45 MIN: CPT | Mod: GO

## 2024-10-23 PROCEDURE — 250N000013 HC RX MED GY IP 250 OP 250 PS 637: Performed by: INTERNAL MEDICINE

## 2024-10-23 PROCEDURE — 85027 COMPLETE CBC AUTOMATED: CPT | Performed by: STUDENT IN AN ORGANIZED HEALTH CARE EDUCATION/TRAINING PROGRAM

## 2024-10-23 PROCEDURE — 99233 SBSQ HOSP IP/OBS HIGH 50: CPT | Performed by: INTERNAL MEDICINE

## 2024-10-23 PROCEDURE — 83735 ASSAY OF MAGNESIUM: CPT | Performed by: STUDENT IN AN ORGANIZED HEALTH CARE EDUCATION/TRAINING PROGRAM

## 2024-10-23 PROCEDURE — 36415 COLL VENOUS BLD VENIPUNCTURE: CPT | Performed by: STUDENT IN AN ORGANIZED HEALTH CARE EDUCATION/TRAINING PROGRAM

## 2024-10-23 PROCEDURE — 999N000157 HC STATISTIC RCP TIME EA 10 MIN

## 2024-10-23 PROCEDURE — 97161 PT EVAL LOW COMPLEX 20 MIN: CPT | Mod: GP

## 2024-10-23 PROCEDURE — 250N000011 HC RX IP 250 OP 636: Performed by: STUDENT IN AN ORGANIZED HEALTH CARE EDUCATION/TRAINING PROGRAM

## 2024-10-23 PROCEDURE — 97535 SELF CARE MNGMENT TRAINING: CPT | Mod: GO

## 2024-10-23 PROCEDURE — 97530 THERAPEUTIC ACTIVITIES: CPT | Mod: GP

## 2024-10-23 PROCEDURE — 80048 BASIC METABOLIC PNL TOTAL CA: CPT | Performed by: STUDENT IN AN ORGANIZED HEALTH CARE EDUCATION/TRAINING PROGRAM

## 2024-10-23 RX ORDER — TAMSULOSIN HYDROCHLORIDE 0.4 MG/1
0.8 CAPSULE ORAL DAILY
Status: DISCONTINUED | OUTPATIENT
Start: 2024-10-23 | End: 2024-10-23

## 2024-10-23 RX ORDER — AMOXICILLIN 250 MG
1 CAPSULE ORAL 2 TIMES DAILY
Status: DISCONTINUED | OUTPATIENT
Start: 2024-10-23 | End: 2024-10-23

## 2024-10-23 RX ORDER — ROSUVASTATIN CALCIUM 40 MG/1
40 TABLET, COATED ORAL AT BEDTIME
Status: DISCONTINUED | OUTPATIENT
Start: 2024-10-23 | End: 2024-10-31 | Stop reason: HOSPADM

## 2024-10-23 RX ADMIN — HYDROMORPHONE HYDROCHLORIDE 0.4 MG: 0.2 INJECTION, SOLUTION INTRAMUSCULAR; INTRAVENOUS; SUBCUTANEOUS at 05:56

## 2024-10-23 RX ADMIN — ACETAMINOPHEN 975 MG: 325 TABLET ORAL at 17:44

## 2024-10-23 RX ADMIN — SODIUM CHLORIDE, POTASSIUM CHLORIDE, SODIUM LACTATE AND CALCIUM CHLORIDE: 600; 310; 30; 20 INJECTION, SOLUTION INTRAVENOUS at 12:25

## 2024-10-23 RX ADMIN — HYDROMORPHONE HYDROCHLORIDE 0.2 MG: 0.2 INJECTION, SOLUTION INTRAMUSCULAR; INTRAVENOUS; SUBCUTANEOUS at 00:10

## 2024-10-23 RX ADMIN — ONDANSETRON 4 MG: 2 INJECTION INTRAMUSCULAR; INTRAVENOUS at 06:39

## 2024-10-23 RX ADMIN — HEPARIN SODIUM 5000 UNITS: 10000 INJECTION, SOLUTION INTRAVENOUS; SUBCUTANEOUS at 16:22

## 2024-10-23 RX ADMIN — ACETAMINOPHEN 975 MG: 325 TABLET ORAL at 09:32

## 2024-10-23 RX ADMIN — ROSUVASTATIN CALCIUM 40 MG: 40 TABLET, FILM COATED ORAL at 20:02

## 2024-10-23 RX ADMIN — ACETAMINOPHEN 975 MG: 325 TABLET ORAL at 02:27

## 2024-10-23 RX ADMIN — NALOXEGOL OXALATE 12.5 MG: 12.5 TABLET, FILM COATED ORAL at 08:21

## 2024-10-23 RX ADMIN — METHOCARBAMOL 500 MG: 500 TABLET ORAL at 14:50

## 2024-10-23 RX ADMIN — HYDROMORPHONE HYDROCHLORIDE 0.4 MG: 0.2 INJECTION, SOLUTION INTRAMUSCULAR; INTRAVENOUS; SUBCUTANEOUS at 19:59

## 2024-10-23 RX ADMIN — Medication 3 MG: at 00:10

## 2024-10-23 RX ADMIN — METHOCARBAMOL 500 MG: 500 TABLET ORAL at 05:56

## 2024-10-23 RX ADMIN — PANTOPRAZOLE SODIUM 40 MG: 40 TABLET, DELAYED RELEASE ORAL at 08:21

## 2024-10-23 RX ADMIN — HYDROMORPHONE HYDROCHLORIDE 0.4 MG: 0.2 INJECTION, SOLUTION INTRAMUSCULAR; INTRAVENOUS; SUBCUTANEOUS at 09:31

## 2024-10-23 RX ADMIN — SODIUM CHLORIDE, POTASSIUM CHLORIDE, SODIUM LACTATE AND CALCIUM CHLORIDE: 600; 310; 30; 20 INJECTION, SOLUTION INTRAVENOUS at 04:04

## 2024-10-23 RX ADMIN — HYDROMORPHONE HYDROCHLORIDE 2 MG: 2 TABLET ORAL at 12:24

## 2024-10-23 RX ADMIN — Medication 3 MG: at 20:02

## 2024-10-23 RX ADMIN — HEPARIN SODIUM 5000 UNITS: 10000 INJECTION, SOLUTION INTRAVENOUS; SUBCUTANEOUS at 08:21

## 2024-10-23 RX ADMIN — HYDROMORPHONE HYDROCHLORIDE 0.4 MG: 0.2 INJECTION, SOLUTION INTRAMUSCULAR; INTRAVENOUS; SUBCUTANEOUS at 17:52

## 2024-10-23 RX ADMIN — HYDROMORPHONE HYDROCHLORIDE 2 MG: 2 TABLET ORAL at 04:13

## 2024-10-23 RX ADMIN — HYDROMORPHONE HYDROCHLORIDE 0.4 MG: 0.2 INJECTION, SOLUTION INTRAMUSCULAR; INTRAVENOUS; SUBCUTANEOUS at 22:26

## 2024-10-23 RX ADMIN — HYDROMORPHONE HYDROCHLORIDE 0.4 MG: 0.2 INJECTION, SOLUTION INTRAMUSCULAR; INTRAVENOUS; SUBCUTANEOUS at 14:34

## 2024-10-23 ASSESSMENT — ACTIVITIES OF DAILY LIVING (ADL)
ADLS_ACUITY_SCORE: 0
PREVIOUS_RESPONSIBILITIES: YARDWORK;MEDICATION MANAGEMENT;FINANCES;DRIVING
ADLS_ACUITY_SCORE: 0
ADLS_ACUITY_SCORE: 22
ADLS_ACUITY_SCORE: 0

## 2024-10-23 NOTE — PROGRESS NOTES
10/23/24 0839   Appointment Info   Signing Clinician's Name / Credentials (OT) Pastora Rueda OT   Living Environment   People in Home spouse   Current Living Arrangements house   Home Accessibility stairs to enter home;stairs within home   Number of Stairs, Main Entrance 1   Stair Railings, Main Entrance other (see comments)  (no rail)   Number of Stairs, Within Home, Primary greater than 10 stairs   Stair Railings, Within Home, Primary railings safe and in good condition   Transportation Anticipated family or friend will provide   Living Environment Comments was independent w/ADLs and mobility   Self-Care   Usual Activity Tolerance good   Current Activity Tolerance fair   Equipment Currently Used at Home none   Fall history within last six months no   Instrumental Activities of Daily Living (IADL)   Previous Responsibilities yardwork;medication management;finances;driving   General Information   Onset of Illness/Injury or Date of Surgery 10/22/24   Referring Physician Dr Lynch   Patient/Family Therapy Goal Statement (OT) go home   Additional Occupational Profile Info/Pertinent History of Current Problem Lobito Zheng is a 69 year old male with a past medical history documented below presented to the hospital for a robotic assisted laparoscopic cystoprostatectomy with bilateral pelvic lymph node dissection and extracorporeal ileal conduit urinary diversion and is s/p procedure, postprocedure medicine was consulted for medical comanagement.   Existing Precautions/Restrictions abdominal   Left Upper Extremity (Weight-bearing Status) full weight-bearing (FWB)   Right Upper Extremity (Weight-bearing Status) full weight-bearing (FWB)   Left Lower Extremity (Weight-bearing Status) full weight-bearing (FWB)   Right Lower Extremity (Weight-bearing Status) full weight-bearing (FWB)   Cognitive Status Examination   Orientation Status orientation to person, place and time   Visual Perception   Visual  Impairment/Limitations corrective lenses full-time   Sensory   Sensory Quick Adds sensation intact   Pain Assessment   Patient Currently in Pain Yes, see Vital Sign flowsheet  (abdominal incision site)   Posture   Posture forward head position   Range of Motion Comprehensive   General Range of Motion no range of motion deficits identified   Strength Comprehensive (MMT)   General Manual Muscle Testing (MMT) Assessment no strength deficits identified   Muscle Tone Assessment   Muscle Tone Quick Adds No deficits were identified   Coordination   Upper Extremity Coordination No deficits were identified   Bed Mobility   Bed Mobility supine-sit   Supine-Sit [place] (Bed Mobility) moderate assist (50% patient effort)   Assistive Device (Bed Mobility) bed rails;draw sheet   Transfers   Transfers bed-chair transfer   Transfer Skill: Bed to Chair/Chair to Bed   Bed-Chair [place] (Transfers) contact guard   Transfer Comments no device   Balance   Balance Assessment standing dynamic balance   Standing Balance: Dynamic contact guard   Activities of Daily Living   BADL Assessment/Intervention lower body dressing   Lower Body Dressing Assessment/Training   Comment, (Lower Body Dressing) abdominal pain   [place] Level (Lower Body Dressing) maximum assist (25% patient effort)   Clinical Impression   Criteria for Skilled Therapeutic Interventions Met (OT) Yes, treatment indicated   OT Diagnosis bladder cancer   Influenced by the following impairments fatigue, decreased ADLs/balance   OT Problem List-Impairments impacting ADL activity tolerance impaired   Assessment of Occupational Performance 3-5 Performance Deficits   Identified Performance Deficits fatigue, decreased ADLs/balance   Planned Therapy Interventions (OT) ADL retraining   Clinical Decision Making Complexity (OT) detailed assessment/moderate complexity   Risk & Benefits of therapy have been explained evaluation/treatment results reviewed;care plan/treatment  goals reviewed;risks/benefits reviewed;participants voiced agreement with care plan   OT Total Evaluation Time   OT Eval, Moderate Complexity Minutes (77197) 10   OT Goals   Therapy Frequency (OT) Daily   OT Predicted Duration/Target Date for Goal Attainment 10/29/24   OT Goals Lower Body Bathing;Hygiene/Grooming;Toilet Transfer/Toileting   OT: Hygiene/Grooming modified independent   OT: Lower Body Bathing Modified independent   OT: Toilet Transfer/Toileting Modified independent   Interventions   Interventions Quick Adds Self-Care/Home Management   Self-Care/Home Management   Self-Care/Home Mgmt/ADL, Compensatory, Meal Prep Minutes (77841) 10   Symptoms Noted During/After Treatment (Meal Preparation/Planning Training) increased pain   Treatment Detail/Skilled Intervention transfers CGA w/out a device at a slow pace due to pain, G/H setup sitting supported, LB dress max A due to pain and decreased LE ROM   OT Discharge Planning   OT Plan transfers/toileting, G/H, LB dress w/AE   OT Discharge Recommendation (DC Rec) home with home care occupational therapy   OT Rationale for DC Rec pt is safe to return home w/family supportt   OT Brief overview of current status CGA w/transfers and max A w/ADLs

## 2024-10-23 NOTE — PROGRESS NOTES
"  [facility] - POSTOP PROGRESS NOTE    PLACE OF SERVICE:  [facility]     SURGERY: POD # 1 after Robotic-assisted laparoscopic radical cystoprostatectomy, Bilateral pelvic lymphadenectomy, Ileal conduit urinary diversion by Dr. Lynch for urothelial cell carcinoma      SUBJECTIVE:   Events: no acute events overnight    Pt c/o abdominal pain this AM, somewhat improved with pain medication. Denies nausea, vomiting, fever, chills, SOB, chest pain, LE pain. Tolerating sips of clear liquids. Not yet passing gas. Up to chair this AM.     OBJECTIVE:  PHYSICAL EXAM  Vital signs:  Temp: 98.1  F (36.7  C) Temp src: Oral BP: 128/77 Pulse: 69   Resp: 18 SpO2: 96 % O2 Device: None (Room air) Oxygen Delivery: 1.5 LPM Height: 175.3 cm (5' 9\") Weight: 102.3 kg (225 lb 8 oz)  Estimated body mass index is 33.3 kg/m  as calculated from the following:    Height as of this encounter: 1.753 m (5' 9\").    Weight as of this encounter: 102.3 kg (225 lb 8 oz).    General: NAD, alert, cooperative  Neuro: A&O x 3. Moving all extremities equally.   Resp: reg resp rate/depth.   Abdomen: appropriately tender, moderately distended, no rebound or peritoneal signs. Urostomy stoma is beefy red in color, ureterostomy tube tips x 2 noted extending from stoma. Urostomy draining yellow urine, 1525 ml so far today. KAVIN exit site dressing saturated, changed.   Incisions: Dressings removed from incisions. C/D/I, closed with staples, no significant ecchymosis noted. Telfa applied over midline incision.   KAVIN: 245 ml so far today, 195 ml on 10/22, serosanguinous in appearance.   LE: CWMS, no bilateral LE edema.     LABS:  No results found for: \"INR\"   Lab Results   Component Value Date    WBC 12.8 10/23/2024     Lab Results   Component Value Date    HGB 10.0 10/23/2024     Lab Results   Component Value Date     10/23/2024     Creatinine   Date Value Ref Range Status   10/23/2024 1.34 (H) 0.67 - 1.17 mg/dL Final     Sodium   Date Value " Ref Range Status   10/23/2024 139 135 - 145 mmol/L Final     Potassium   Date Value Ref Range Status   10/23/2024 4.4 3.4 - 5.3 mmol/L Final     Magnesium   Date Value Ref Range Status   10/23/2024 2.3 1.7 - 2.3 mg/dL Final     Lab Results: personally reviewed.     ASSESSMENT/PLAN:  POD #1 after Robotic-assisted laparoscopic radical cystoprostatectomy, Bilateral pelvic lymphadenectomy, Ileal conduit urinary diversion by Dr. Lynch for urothelial cell carcinoma      - Diet - clear liquid diet as tolerated.   - Drains -  245 ml output so far today, 195 ml on 10/22, serosanguinous in appearance.   - Creatinine 1.34 today  (baseline 1.42-1.48).   - Hgb stable, 10.0.  - Urostomy output 1525 ml so far today.   - LR IVF rate decreased to 75 ml/hour.   - Labs ordered - CBC and BMP in AM.   - Activity - encourage ambulation and incentive spirometer. Appreciate PT/OT recommendations.   - DVT prophylaxis: SCDs, subcutaneous heparin, ambulation   - Appreciate Hospitalist assistance with medical management.   - Appreciate WOC RN assistance with urostomy management/teaching.   - Anticipated discharge: 5-7 days.       Discussed patient with  Dr. Syed Rehman, APRN, CNP  [facility]   [phone]

## 2024-10-23 NOTE — PLAN OF CARE
Alert and oriented. Up and dangled for 10 minutes. Pain controlled with IV dilaudid. Oxycodone causes nausea he reported. IV fluids running. Etco2 monitoring showing good readings. Per provider okay to remove etco2 monitoring to have cpap running. RT set up CPAP. Titrated o2 down to 0.     Problem: Adult Inpatient Plan of Care  Goal: Absence of Hospital-Acquired Illness or Injury  Intervention: Prevent and Manage VTE (Venous Thromboembolism) Risk  Recent Flowsheet Documentation  Taken 10/22/2024 1903 by David Richmond, RN  VTE Prevention/Management: SCDs on (sequential compression devices)   Goal Outcome Evaluation:

## 2024-10-23 NOTE — CONSULTS
[facility] Nurse Inpatient Assessment     Consulted for: urostomy, ileal conduit      Patient History (according to provider note(s):      Lobito Zheng is a 69 year old male with a past medical history documented below presented to the hospital for a robotic assisted laparoscopic cystoprostatectomy with bilateral pelvic lymph node dissection and extracorporeal ileal conduit urinary diversion and is s/p procedure     Assessment:      Areas visualized during today's visit: Abdomen    Assessment of new end Urostomy:  Diagnosis Pertinent to Stoma: Cancer - Bladder      Surgery Date: 10/22/2024  Surgeon:[facility]: [facility]  Pouching system in place on assessment today: Carmen two piece and cut to fit   Pouch barrier status: Edges lifting   Pouch last changed/wear time: 1 day  Reason for pouch change today: ostomy education and initial post-op assessment  Effectiveness of current pouching/ supply plan: Needs soft convexity at discharge   Change made with ostomy management today: No  Pouching system placed today: [place] two piece, cut to fit, and flat (abdomen very distended, once softened may need to adjust)  Supplies: gathered        Last photo: 10/23  Stoma location: RUQ  Stoma size: 1.25 high x 1.5 wide inches, Ureteral stents  Stoma appearance: beefy red, oval, moist, and edematous  Mucocutaneous junction:  intact  Peristomal complication(s): none   Output: brown and alyssa  Output volume emptied during visit: 0 ml  Abdominal assessment: Distended and Firm  Surgical site(s): staples intact  NG still in place? No  Pain: Sharp and Stabbing  Is patient still on a PCA? No    Ostomy education assessment:  Participant of teaching session today: patient  and spouse  Education completed today: Initial fitting, Stoma assessment, Pouching system assessment , Refitting of appliance , Pouch change demonstration, Ostomy accessory product use , Introduction to pouches,  "Peristomal skin care, Pouch emptying demonstration, and Importance of hydration  Educational materials/methods: Verbal, Written, Demonstration, and Addressed patient/caregiver questions/concerns  Education still needed: Pouch change return demonstration, Pouch emptying return demonstration, Intake and output recording, Fluid and electrolyte balance , When to seek medical attention, Odor/flatus management , Infection prevention/hygiene , Hernia prevention, Lifestyle adjustments , and Discharge instructions  Learning Comprehension:   Psychosocial assessment: ready  Patient readiness for education today: observing and attentive  Following today's visit: patient  and spouse is able to demonstrate;         1. How to empty their pouch? Yes and with minimal assist        2. How to change their pouch? No and Needs additional practice         3. How to read and record intake and output correctly? No  Preparation for discharge completed: Placed prescription recommendations in discharge navigator for MD to sign and Ordered samples from  after gaining consent from patient/caregiver  Preparation for discharge still needed: Ensured patient has extra supplies for discharge, Discussed how to order supplies after discharge , Discussed how and when to make an outpatient WOC nurse appointment after discharge, Prepared for discharge home with home care, and Discuss signs/symptoms of when to seek medical attention  Pt support system on discharge: family  WOC recommend home care? Yes  Face to face time: 45 minutes           Treatment Plan:     RUQ Urostomy pouching plan:   Pouching system: ostomy supplies pouches: Carmen Urine 2 pc (144703)  ostomy supplies barrier: Chase 57mm FLAT ([id number])  Accessories used: [facility] ostomy accessories: 2\" Cera Barrier Ring (033850)   Frequency of pouch changes: Twice weekly and PRN leakage  WOC follow up plan: Daily Monday-Friday (as able)  Bedside RN interventions: Change pouch PRN if " leaking using the supplies above, Empty pouch when 1/3 to 1/2 full, ensure to clean pouch outlet after emptying to prevent odor, Notify WOC for ongoing pouch leakage, Document stoma appearance and output volume, color, and consistency every shift, and Encourage patient to empty pouch with assist      Orders: Written    RECOMMEND PRIMARY TEAM ORDER: None, at this time  Education provided: plan of care  Discussed plan of care with: Patient and Family  WOC nurse follow-up plan: daily  Notify WOC if wound(s) deteriorate.  Nursing to notify the Provider(s) and re-consult the WOC Nurse if new skin concern.    DATA:     Current support surface: Standard  Standard gel mattress (Isoflex)  Containment of urine/stool: Continent of bowel and Urostomy pouch  BMI: Body mass index is 33.3 kg/m .   Active diet order: Orders Placed This Encounter      NPO for Medical/Clinical Reasons Except for: Meds, Ice Chips, Other; Specify: also ok for sips of clears     Output: I/O last 3 completed shifts:  In: 2199 [I.V.:1699]  Out: 2560 [Urine:2175; Drains:310; Blood:75]     Labs:   Recent Labs   Lab 10/23/24  0604 10/22/24  1738   HGB 10.0* 10.4*   WBC 12.8* 12.2*   A1C  --  5.3     Pressure injury risk assessment:   Sensory Perception: 4-->no impairment  Moisture: 4-->rarely moist  Activity: 3-->walks occasionally  Mobility: 3-->slightly limited  Nutrition: 3-->adequate  Friction and Shear: 2-->potential problem  Cesar Score: 19    ARCHIE McclellandN RN CWOCN  Pager no longer in use, please contact through [facility] group: [facility]

## 2024-10-23 NOTE — PROGRESS NOTES
Dual Skin Assessment Note:    Patient transferred from PACU from to P2.     Comprehensive skin inspection completed by myself and Laura Todd RN.     Abnormal skin assessment findings: yes scabs on right shin and knee    LDA Initiated for skin breakdown/non-blanchable redness: No    Provider notified: No    If yes, WOC Consult order obtained: No    David Richmond RN 10:54 PM

## 2024-10-23 NOTE — PLAN OF CARE
Problem: Adult Inpatient Plan of Care  Goal: Optimal Comfort and Wellbeing  Outcome: Progressing  Intervention: Monitor Pain and Promote Comfort  Recent Flowsheet Documentation  Taken 10/23/2024 0931 by Tito Cárdenas RN  Pain Management Interventions:   relaxation techniques promoted   medication (see MAR)     Problem: Surgery Nonspecified  Goal: Optimal Pain Control and Function  Intervention: Prevent or Manage Pain  Recent Flowsheet Documentation  Taken 10/23/2024 0931 by Tito Cárdenas, RN  Pain Management Interventions:   relaxation techniques promoted   medication (see MAR)     Problem: Surgery Nonspecified  Goal: Effective Urinary Elimination  Outcome: Progressing     Problem: Surgery Nonspecified  Goal: Effective Oxygenation and Ventilation  Outcome: Progressing  Intervention: Optimize Oxygenation and Ventilation  Recent Flowsheet Documentation  Taken 10/23/2024 0833 by Tito Cárdenas RN  Activity Management: activity adjusted per tolerance  Patient alert and oriented.  Current pain regimen helpful with controlling patients abdominal pain.  Abdomin distended.  Bowel sounds active, but has not passed gas yet.  KAVIN drain putting out large amount of blood drainage.  Urostomy functioning.  WOC nurse in and changed bag.  Pt was up in chair today.  Encouraging activity.

## 2024-10-23 NOTE — DISCHARGE INSTRUCTIONS
WOC DISCHARGE INSTRUCTIONS:          Ostomy Discharge Instructions/Orders    Hospital:  [facility]    Ostomy Clinic Follow-up:  Ostomy clinic appointment  Please arrive 10 - 15 minutes prior to your ostomy clinic appointment, so you can complete the registration process.  [facility] ([phone]) Please leave a voice message including your name, date of birth and phone number requesting a clinic appointment. On the day of your appointment, enter the main doors of [facility] and tell the people at the desk you are there for the ostomy clinic. They will direct your next steps.  Follow up 2 - 4 weeks post-op for a stoma site assessment - call your clinic to make an appointment    Pouch Change Instructions:  1. Change appliance twice weekly and as needed for any leakage.  Notify the WO Nurse if changing pouch more often than daily or if skin is itchy.  2. Empty pouch when no more than 1/3 to 1/2 full (solid, liquid, gas).  3. May shower with pouch on or off, removing pouch/ barrier down to the skin is ok. (Note: fecal output can not be controlled, so there may be occasional clean up if you choose to shower without a pouch.)    The pouching procedure:  1.  Use the  Pouch Change Instruction  sheet to gather and organize supplies  2.  Trace old pattern onto new pouch and cut out new opening on new barrier.  3.  Remove old pouch, observe for any leakage.  4.  Clean skin with water and toilet paper.  5.  Apply Ring around stoma. (May use other products as instructed by WO nurse at this time.)  6.  Apply prepared barrier to the clean skin and press into place.  7.  Hold hand on pouch/over stoma to warm pouch (2 - 5 minutes) to help adherence.    Ostomy products to use at hospital discharge (supplies sent home from hospital, use until new supplies arrive):  Carmen 2 piece urinary clear #27142 PS# [id number]  Flat CTF #20334 PS# [id number]  3M Cavilon No-Sting skin barrier #4937, Carmen Adapt Cera  Ring #4945, Carmen Adapt Powder #5542, and Carmen Ostomy Belt - lrg #[id number]    ABC's of Ostomy Care  Activity:  Walk short distances & increase as your strength allows  You may climb stairs  Do not do strenuous exercise or activity such as golfing or heavy lifting  Do not drive until approved by your doctor  Carry an extra pouching system with you, be prepared (do not leave this in the car as weather may adversely affect the adhesive)    Bathing:  You may shower with your pouch on; Dry under the pouch after the shower  If it is your change day, you may remove pouch and shower without it    Clothing:  Wear loose clothing for comfort in the immediate post-op period  No leather belts should be worn near the stoma, as it can cause an injury to the stoma mucosa    Diet:  Consume foods throughout the day that will help to thicken output and therefore help maintain a good seal.    Consider these foods:  Grains such as pasta, rice, soda crackers ,pretzels, cheese and yogurt, applesauce, bananas, potatoes, peanut butter and tapioca  Some foods increase/thin your output: Sugars,Prunes, Raisins  Eat 3-4 servings of protein per day  No timed release medications    Avoiding a blockage:  Eat 6 small meals/day; small bites and chew well  Avoid raw vegetables, seeds, nuts peels, grapefruit, oranges, pineapple, fruit peelings (e.g. apple)  Avoid tough meats like jerkies and meat with casings; ground meat is easier to digest, chew other cuts of meat well  If the food cannot be cut easily with a fork avoid for now.  IF you have a blockage try to gently massage blockage, do NOT take a laxative and call MD.    Fluids:  Drink plenty of fluids, minimally  8 cups of non-caffeinated beverage per day  plus if you empty your pouch, have another glass of fluid  Remember caffeine and alcohol makes you more dehydrated, be sure to add in more fluids if consuming caffeine or alcohol  Monitor the color of your urine, if getting too dark, need  more fluids    Avoiding dehydration:  Remember caffeine and alcohol makes you more dehydrated, be sure to add in more fluids if consuming caffeine or alcohol  Monitor the color of your urine, if getting too dark increase fluid intake  Keep your input/output in balance and notify MD if out of balance  If you feel you are getting dehydrated then consider drinking Gatorade/Pedialyte or similar beverage    Stoma Injury or Concern:  Call the [facility] Nurse to report the following concerns (see phone number above):  Skin concerns around the stoma: red, rash, open areas  Concerns with the pouch opening being too small or too large  Bulging around the stoma without pain and with continued output  Pouch leaking daily or more frequently    Call the surgeon or go to the emergency department for the following concerns:  Stoma turns blue or darker in color  Bulging around the stoma with no output; likely will have pain as well      Supply Ordering after hospital discharge:  Upon discharge from the hospital you will be sent home with ostomy supplies.   These supplies are to be used for your twice weekly stoma site care while you order/set up your monthly supply order.     If you have been set up for home care visits the home home care nurse will help you coordinate ordering supplies.      If you have not been set up for home care then make sure to make a follow up appointment with the [facility] nurse to reassess your abdomen and ostomy for changes and determine the correct plan.  At that time ordering supplies will be reviewed.      Supply ordering:  You are responsible to order ostomy supplies after discharge from the hospital.  Please contact your medical supply company and give them the information detailed below related to ostomy supplies. The medical supply company will give further instructions if necessary.    You may choose whichever ostomy product supplier you desire. Some more common suppliers/contact information are listed  "below:  [employer] Phone: [phone] ext. [phone]; Fax: [phone] (local company with store near [facility] and  in [place])  [facility] Ph: [phone] ext. [phone]  [employer]   Phone: [phone]  [employer] Phone: [phone] ext. [phone]    Your Medical Supplier will need your surgeon's name, phone and fax number:  Clinic.orders: [facility] ([place]): [phone]; Fax: [phone]    Surgery date:  10/22/2024  Surgeon:  Syed  Procedure:  ileal conduit  Related diagnosis:  cancer    Recommended supplies to order:  Pouches:  Urostomy pouch Carmen CTF #74471  Skin barriers:  Accessories:Ring/Paste: Carmen Adapt Cera 2\" Ring #[id number] Barrier Strips: [place] barrier extenders #37493Qdknq: [place] Large (29\" - 49\") #7299Urostomy Drainage Products: Bard urine drainage bag #[id number],  Bannish II liquid urine deodorizer SN#[id number], and Urikleen  SN#[id number] Accessories: Carmen Adapt powder #7906 and 3M Cavilon No Sting Skin barrier #3344    Authorizing MD: Dr. Lynch    Verbal Order for Ostomy Supplies for 1 month per:   Signature: Justino Nesbitt      Additional Ostomy Resources:  Support Groups:   [email or web address].[email or web address] - [employer] Ostomy Association [place] - email newsletters and in-person monthly meetings with virtual option   2.   [email or web address] - Located within [facility] ostomy support group - newsletters and in-person monthly meetings     Virtual Ostomy Support  [employer]- https://[email or web address].co/    Common Product Manufacturers:  Tagoo/en/ostomycare  2.   Coloplast.[email or web address]/ostomy  3.   [email or web address].Drywave/ostomy    Ostomy Support Products:  Stealthbelt.com - pouch supports/covers  2.   Ostobuddy - smart phone vitor to help manage ostomy cares        "

## 2024-10-23 NOTE — PLAN OF CARE
Problem: Surgery Nonspecified  Goal: Optimal Pain Control and Function  Outcome: Progressing  Goal: Effective Urinary Elimination  Outcome: Progressing  Goal: Effective Oxygenation and Ventilation  Outcome: Progressing     Problem: Comorbidity Management  Goal: Blood Glucose Levels Within Targeted Range  Outcome: Progressing        Problem: Adult Inpatient Plan of Care  Goal: Optimal Comfort and Wellbeing  Outcome: Progressing   Goal Outcome Evaluation:      Patient is alert and oriented x 4. His spouse is by the bedside. Pt has not passed gas yet. Bowel sounds hypoactive. Urostomy pouch is intact, draining slightly pink/alyssa urine, noted to be clearing this morning. KAVIN drain is intact except leaking at site. KAVIN dressing changed. Abdominal dressings at lap sites with saturation, marked. Tolerating ice chips and sips of water well. No nausea. 2 am . Complained of abdominal pain rated 5/10, given prn iv dilaudid 0.2 mg at 0010 and scheduled tylenol. Given oral dilaudid 2 mg at 0413 with some relief.  On  ml/hr.

## 2024-10-23 NOTE — PROGRESS NOTES
[facility]    Medicine Progress Note - Hospitalist Service    Date of Admission:  10/22/2024    Assessment & Plan    Lobito Zheng is a 69 year old male with a past medical history documented below presented to the hospital for a robotic assisted laparoscopic cystoprostatectomy with bilateral pelvic lymph node dissection and extracorporeal ileal conduit urinary diversion and is s/p procedure, postprocedure medicine was consulted for medical comanagement.     Urothelial carcinoma  -Presented to the hospital for robotic assisted laparoscopic cystoprostatectomy with bilateral pelvic lymph node dissection and extracorporeal ileal conduit urinary diversion  -Urology following, follow-up recommendations  -Continue with pain management as per protocol  -Surgical pathology report pending  -Clear liquid diet     VAMSHI on CKD stage III  -Possibly a combination of prerenal in postrenal etiology  -Baseline creatinine around 1.2  -Current creatinine 1.48  -Discontinued IV fluids due to elevated blood pressures but will continue to monitor     Non-insulin-dependent type 2 diabetes mellitus-now with normal A1c on metformin 1000 mg twice daily at home  -Monitor blood sugar level  -Hypoglycemia protocol  -Continue sliding scale  -Holding metformin for now  -10/22: Hemoglobin A1c 5.3%    Hyperlipidemia  -Continue with rosuvastatin     Obstructive sleep apnea  -Continue CPAP     Anemia and thrombocytopenia  -Monitor CBC, follow-up iron panel, folate and vitamin B12 levels     Elevated blood pressure without a diagnosis of hypertension  -Monitor vital signs as per protocol  -IV hydralazine as needed for elevated blood pressure        Diet: Clear Liquid Diet    DVT Prophylaxis: Heparin SQ  Lombardo Catheter: Not present  Lines: None     Cardiac Monitoring: None  Code Status: Full Code      Clinically Significant Risk Factors                 # Thrombocytopenia: Lowest platelets = 125 in last 2 days, will monitor for  "bleeding             # Obesity: Estimated body mass index is 33.3 kg/m  as calculated from the following:    Height as of this encounter: 1.753 m (5' 9\").    Weight as of this encounter: 102.3 kg (225 lb 8 oz)., PRESENT ON ADMISSION            Disposition Plan     Medically Ready for Discharge: Anticipated in 5+ Days             Regina Suarez MD  Hospitalist Service  [facility]  Securely message with [employer] (more info)  Text page via [employer] Paging/Directory   ______________________________________________________________________    Interval History   Mr. Zheng is doing okay. He s having abdominal distention and pain as expected. He is being seen by therapy. His abdomen is very tender. Will order abdominal binder for walking. While in bed does not need to use.  He has been started on a CLD and is having elevated pressures this afternoon so will stop IV fluids and monitor. I have restarted his appropriate home oral medications. Discussed with urology.     Physical Exam   Vital Signs: Temp: 97.9  F (36.6  C) Temp src: Oral BP: (!) 146/85 Pulse: 79   Resp: 18 SpO2: 94 % O2 Device: None (Room air) Oxygen Delivery: 1.5 LPM  Weight: 225 lbs 8 oz    General Appearance: Awake, alert, in no acute distress  Respiratory: CTAB, no wheeze  Cardiovascular: RRR, no murmur noted  GI: soft, nontender, non distended, normal bowel sounds  Skin: no jaundice, no rash      Medical Decision Making       50 MINUTES SPENT BY ME on the date of service doing chart review, history, exam, documentation & further activities per the note.      Data     I have personally reviewed the following data over the past 24 hrs:    12.8 (H)  \   10.0 (L)   / 132 (L)     139 103 20.8 /  109 (H)   4.4 25 1.34 (H) \     TSH: N/A T4: N/A A1C: 5.3     Ferritin:  N/A % Retic:  2.4 (H) LDH:  N/A       Imaging results reviewed over the past 24 hrs:   Recent Results (from the past 24 hours)   XR Abdomen Port 1 View    Narrative    EXAM: " XR ABDOMEN PORT 1 VIEW  LOCATION: [facility]  DATE: 10/23/2024    INDICATION: POD#1 cystectomy with ileal conduit, please assess for bilateral ureteral stent positioning  COMPARISON: 3/5/2024      Impression    IMPRESSION: Lower pelvis is clipped.     Right double-J ureteral stent has been removed.    New, postsurgical changes are seen with numerous clips at port sites and in the lower midpelvis. Incomplete visualization of the left-sided pelvic drain extending to the right. Bilateral double-J ureteral stents which are exiting the right lower quadrant   ileal loop.    There is mild gaseous distention of small bowel loops in the midabdomen due to mild postoperative ileus.

## 2024-10-23 NOTE — PROGRESS NOTES
10/23/24 1500   Appointment Info   Signing Clinician's Name / Credentials (PT) Анна Albert, PT, DPT   Living Environment   People in Home spouse   Current Living Arrangements house   Home Accessibility stairs to enter home;stairs within home   Number of Stairs, Main Entrance 1   Stair Railings, Main Entrance none   Transportation Anticipated family or friend will provide   Living Environment Comments All needs met on main level.   Self-Care   Usual Activity Tolerance good   Current Activity Tolerance fair   Equipment Currently Used at Home none   Fall history within last six months no   General Information   Onset of Illness/Injury or Date of Surgery 10/22/24   Referring Physician Dharmesh Lynch MD   Patient/Family Therapy Goals Statement (PT) Return home   Pertinent History of Current Problem (include personal factors and/or comorbidities that impact the POC) POD # 1 after Robotic-assisted laparoscopic radical cystoprostatectomy, Bilateral pelvic lymphadenectomy, Ileal conduit urinary diversion by Dr. Lynch for urothelial cell carcinoma   Existing Precautions/Restrictions abdominal   Range of Motion (ROM)   Range of Motion ROM is WFL   Strength (Manual Muscle Testing)   Strength (Manual Muscle Testing) strength is WFL   Bed Mobility   Bed Mobility supine-sit   Supine-Sit [place] (Bed Mobility) supervision;verbal cues   Impairments Contributing to Impaired Bed Mobility pain   Transfers   Transfers sit-stand transfer   Impairments Contributing to Impaired Transfers pain   Sit-Stand Transfer   Sit-Stand [place] (Transfers) supervision;verbal cues   Assistive Device (Sit-Stand Transfers) walker, front-wheeled   Gait/Stairs (Locomotion)   [place] Level (Gait) supervision;verbal cues   Assistive Device (Gait) walker, front-wheeled   Distance in Feet (Gait) 5'   Pattern (Gait) step-to   Deviations/Abnormal Patterns (Gait) jose decreased   Comment, (Gait/Stairs) Stairs not tested due to pain.    Clinical Impression   Criteria for Skilled Therapeutic Intervention Yes, treatment indicated   PT Diagnosis (PT) Impaired functional mobility   Influenced by the following impairments Pain   Functional limitations due to impairments Bed mobility, transfers, gait, stairs   Clinical Presentation (PT Evaluation Complexity) stable   Clinical Presentation Rationale Patient presents as medically diagnosed.   Clinical Decision Making (Complexity) low complexity   Planned Therapy Interventions (PT) bed mobility training;gait training;patient/family education;stair training;strengthening;transfer training   Risk & Benefits of therapy have been explained evaluation/treatment results reviewed;care plan/treatment goals reviewed;patient   PT Total Evaluation Time   PT Eval, Low Complexity Minutes (80326) 10   Physical Therapy Goals   PT Frequency Daily   PT Predicted Duration/Target Date for Goal Attainment 10/30/24   PT Goals Bed Mobility;Transfers;Gait;Stairs   PT: Bed Mobility Independent;Supine to/from sit;Within precautions   PT: Transfers Modified independent;Sit to/from stand;Assistive device  (FWW if needed)   PT: Gait Modified independent;Assistive device;150 feet  (FWW if needed)   PT: Stairs Supervision/stand-by assist;1 stair   Interventions   Interventions Quick Adds Therapeutic Activity   Therapeutic Activity   Therapeutic Activities: dynamic activities to improve functional performance Minutes (26500) 10   Symptoms Noted During/After Treatment Increased pain  (Abdominal pain rated 9/10)   Treatment Detail/Skilled Intervention Verbal cues provided for log rolling technique to complete bed mobility. Once standing, patient was able to progress mobility and ambulate 20' into hallway and back. Educated patient and spouse on importance of activity. Patient was set up in recliner at end of session.   PT Discharge Planning   PT Plan Progress ambulation distance, transfers, log rolling, 1 PILO   PT Discharge  Recommendation (DC Rec) home with assist;home with home care physical therapy   PT Rationale for DC Rec Patient's mobility is limited by abdominal pain, but patient is still able to complete all mobility with SBA. Patient may benefit from home PT pending progress.   PT Brief overview of current status SBA for bed mobility, transfers, and gait with FWW up to 25'.   PT Equipment Needed at Discharge walker, rolling  (FWW)   Physical Therapy Time and Intention   Timed Code Treatment Minutes 10   Total Session Time (sum of timed and untimed services) 20

## 2024-10-24 ENCOUNTER — APPOINTMENT (OUTPATIENT)
Dept: PHYSICAL THERAPY | Facility: HOSPITAL | Age: 69
DRG: 653 | End: 2024-10-24
Attending: STUDENT IN AN ORGANIZED HEALTH CARE EDUCATION/TRAINING PROGRAM
Payer: COMMERCIAL

## 2024-10-24 LAB
ANION GAP SERPL CALCULATED.3IONS-SCNC: 10 MMOL/L (ref 7–15)
BUN SERPL-MCNC: 22 MG/DL (ref 8–23)
CALCIUM SERPL-MCNC: 9.1 MG/DL (ref 8.8–10.4)
CHLORIDE SERPL-SCNC: 106 MMOL/L (ref 98–107)
CREAT FLD-MCNC: 1.5 MG/DL
CREAT SERPL-MCNC: 1.47 MG/DL (ref 0.67–1.17)
CREATININE BODY FLUID SOURCE: NORMAL
EGFRCR SERPLBLD CKD-EPI 2021: 52 ML/MIN/1.73M2
ERYTHROCYTE [DISTWIDTH] IN BLOOD BY AUTOMATED COUNT: 17.3 % (ref 10–15)
GLUCOSE BLDC GLUCOMTR-MCNC: 105 MG/DL (ref 70–99)
GLUCOSE BLDC GLUCOMTR-MCNC: 93 MG/DL (ref 70–99)
GLUCOSE SERPL-MCNC: 105 MG/DL (ref 70–99)
HCO3 SERPL-SCNC: 24 MMOL/L (ref 22–29)
HCT VFR BLD AUTO: 35.5 % (ref 40–53)
HGB BLD-MCNC: 11.1 G/DL (ref 13.3–17.7)
MCH RBC QN AUTO: 29.4 PG (ref 26.5–33)
MCHC RBC AUTO-ENTMCNC: 31.3 G/DL (ref 31.5–36.5)
MCV RBC AUTO: 94 FL (ref 78–100)
PLATELET # BLD AUTO: 163 10E3/UL (ref 150–450)
POTASSIUM SERPL-SCNC: 4.4 MMOL/L (ref 3.4–5.3)
RBC # BLD AUTO: 3.78 10E6/UL (ref 4.4–5.9)
SODIUM SERPL-SCNC: 140 MMOL/L (ref 135–145)
WBC # BLD AUTO: 11.8 10E3/UL (ref 4–11)

## 2024-10-24 PROCEDURE — 82570 ASSAY OF URINE CREATININE: CPT | Performed by: NURSE PRACTITIONER

## 2024-10-24 PROCEDURE — 97116 GAIT TRAINING THERAPY: CPT | Mod: GP

## 2024-10-24 PROCEDURE — 94660 CPAP INITIATION&MGMT: CPT

## 2024-10-24 PROCEDURE — 258N000003 HC RX IP 258 OP 636: Performed by: STUDENT IN AN ORGANIZED HEALTH CARE EDUCATION/TRAINING PROGRAM

## 2024-10-24 PROCEDURE — 120N000001 HC R&B MED SURG/OB

## 2024-10-24 PROCEDURE — 97530 THERAPEUTIC ACTIVITIES: CPT | Mod: GP

## 2024-10-24 PROCEDURE — 36415 COLL VENOUS BLD VENIPUNCTURE: CPT | Performed by: NURSE PRACTITIONER

## 2024-10-24 PROCEDURE — 250N000011 HC RX IP 250 OP 636: Performed by: STUDENT IN AN ORGANIZED HEALTH CARE EDUCATION/TRAINING PROGRAM

## 2024-10-24 PROCEDURE — 999N000197 HC STATISTIC WOC PT EDUCATION, 0-15 MIN

## 2024-10-24 PROCEDURE — 250N000013 HC RX MED GY IP 250 OP 250 PS 637: Performed by: STUDENT IN AN ORGANIZED HEALTH CARE EDUCATION/TRAINING PROGRAM

## 2024-10-24 PROCEDURE — 85027 COMPLETE CBC AUTOMATED: CPT | Performed by: NURSE PRACTITIONER

## 2024-10-24 PROCEDURE — 250N000013 HC RX MED GY IP 250 OP 250 PS 637: Performed by: INTERNAL MEDICINE

## 2024-10-24 PROCEDURE — 99232 SBSQ HOSP IP/OBS MODERATE 35: CPT | Performed by: INTERNAL MEDICINE

## 2024-10-24 PROCEDURE — 80048 BASIC METABOLIC PNL TOTAL CA: CPT | Performed by: NURSE PRACTITIONER

## 2024-10-24 PROCEDURE — 999N000157 HC STATISTIC RCP TIME EA 10 MIN

## 2024-10-24 RX ORDER — HYDROMORPHONE HCL IN WATER/PF 6 MG/30 ML
0.4 PATIENT CONTROLLED ANALGESIA SYRINGE INTRAVENOUS EVERY 6 HOURS PRN
Status: DISCONTINUED | OUTPATIENT
Start: 2024-10-24 | End: 2024-10-31 | Stop reason: HOSPADM

## 2024-10-24 RX ORDER — HYDROMORPHONE HCL IN WATER/PF 6 MG/30 ML
0.2 PATIENT CONTROLLED ANALGESIA SYRINGE INTRAVENOUS EVERY 6 HOURS PRN
Status: DISCONTINUED | OUTPATIENT
Start: 2024-10-24 | End: 2024-10-31 | Stop reason: HOSPADM

## 2024-10-24 RX ORDER — SODIUM CHLORIDE, SODIUM LACTATE, POTASSIUM CHLORIDE, CALCIUM CHLORIDE 600; 310; 30; 20 MG/100ML; MG/100ML; MG/100ML; MG/100ML
INJECTION, SOLUTION INTRAVENOUS CONTINUOUS
Status: DISCONTINUED | OUTPATIENT
Start: 2024-10-24 | End: 2024-10-25

## 2024-10-24 RX ADMIN — Medication 3 MG: at 23:58

## 2024-10-24 RX ADMIN — HYDROMORPHONE HYDROCHLORIDE 0.4 MG: 0.2 INJECTION, SOLUTION INTRAMUSCULAR; INTRAVENOUS; SUBCUTANEOUS at 20:45

## 2024-10-24 RX ADMIN — METHOCARBAMOL 500 MG: 500 TABLET ORAL at 14:45

## 2024-10-24 RX ADMIN — HYDROMORPHONE HYDROCHLORIDE 2 MG: 2 TABLET ORAL at 18:24

## 2024-10-24 RX ADMIN — HEPARIN SODIUM 5000 UNITS: 10000 INJECTION, SOLUTION INTRAVENOUS; SUBCUTANEOUS at 16:32

## 2024-10-24 RX ADMIN — NALOXEGOL OXALATE 12.5 MG: 12.5 TABLET, FILM COATED ORAL at 06:17

## 2024-10-24 RX ADMIN — HYDROMORPHONE HYDROCHLORIDE 0.4 MG: 0.2 INJECTION, SOLUTION INTRAMUSCULAR; INTRAVENOUS; SUBCUTANEOUS at 05:14

## 2024-10-24 RX ADMIN — HYDROMORPHONE HYDROCHLORIDE 0.4 MG: 0.2 INJECTION, SOLUTION INTRAMUSCULAR; INTRAVENOUS; SUBCUTANEOUS at 01:01

## 2024-10-24 RX ADMIN — HYDROMORPHONE HYDROCHLORIDE 0.4 MG: 0.2 INJECTION, SOLUTION INTRAMUSCULAR; INTRAVENOUS; SUBCUTANEOUS at 14:45

## 2024-10-24 RX ADMIN — HYDRALAZINE HYDROCHLORIDE 10 MG: 20 INJECTION INTRAMUSCULAR; INTRAVENOUS at 19:07

## 2024-10-24 RX ADMIN — ACETAMINOPHEN 975 MG: 325 TABLET ORAL at 18:25

## 2024-10-24 RX ADMIN — SODIUM CHLORIDE, POTASSIUM CHLORIDE, SODIUM LACTATE AND CALCIUM CHLORIDE: 600; 310; 30; 20 INJECTION, SOLUTION INTRAVENOUS at 18:25

## 2024-10-24 RX ADMIN — HYDROMORPHONE HYDROCHLORIDE 0.4 MG: 0.2 INJECTION, SOLUTION INTRAMUSCULAR; INTRAVENOUS; SUBCUTANEOUS at 08:48

## 2024-10-24 RX ADMIN — HYDROMORPHONE HYDROCHLORIDE 2 MG: 2 TABLET ORAL at 07:46

## 2024-10-24 RX ADMIN — HYDROMORPHONE HYDROCHLORIDE 2 MG: 2 TABLET ORAL at 11:08

## 2024-10-24 RX ADMIN — HYDROMORPHONE HYDROCHLORIDE 2 MG: 2 TABLET ORAL at 22:30

## 2024-10-24 RX ADMIN — HEPARIN SODIUM 5000 UNITS: 10000 INJECTION, SOLUTION INTRAVENOUS; SUBCUTANEOUS at 07:47

## 2024-10-24 RX ADMIN — ACETAMINOPHEN 975 MG: 325 TABLET ORAL at 09:57

## 2024-10-24 RX ADMIN — HYDRALAZINE HYDROCHLORIDE 10 MG: 20 INJECTION INTRAMUSCULAR; INTRAVENOUS at 12:39

## 2024-10-24 RX ADMIN — ACETAMINOPHEN 975 MG: 325 TABLET ORAL at 02:43

## 2024-10-24 RX ADMIN — HEPARIN SODIUM 5000 UNITS: 10000 INJECTION, SOLUTION INTRAVENOUS; SUBCUTANEOUS at 23:58

## 2024-10-24 RX ADMIN — PANTOPRAZOLE SODIUM 40 MG: 40 TABLET, DELAYED RELEASE ORAL at 06:17

## 2024-10-24 RX ADMIN — ROSUVASTATIN CALCIUM 40 MG: 40 TABLET, FILM COATED ORAL at 20:38

## 2024-10-24 RX ADMIN — HEPARIN SODIUM 5000 UNITS: 10000 INJECTION, SOLUTION INTRAVENOUS; SUBCUTANEOUS at 01:01

## 2024-10-24 RX ADMIN — METHOCARBAMOL 500 MG: 500 TABLET ORAL at 23:58

## 2024-10-24 ASSESSMENT — ACTIVITIES OF DAILY LIVING (ADL)
ADLS_ACUITY_SCORE: 0
DEPENDENT_IADLS:: INDEPENDENT
ADLS_ACUITY_SCORE: 0

## 2024-10-24 NOTE — PLAN OF CARE
Problem: Adult Inpatient Plan of Care  Goal: Optimal Comfort and Wellbeing  Intervention: Monitor Pain and Promote Comfort  Recent Flowsheet Documentation  Taken 10/24/2024 0511 by Destiny Gallo, RN  Pain Management Interventions: medication (see MAR)  Taken 10/24/2024 0101 by Destiny Gallo, RN  Pain Management Interventions: medication (see MAR)   Goal Outcome Evaluation:       Pt pain 9/10, 0.4 dilaudid given at 0100 and 0520. Pt slept most of night. KAVIN having a lot of output, emptied almost hourly. Urostomy CDI, having pink/yellow output.

## 2024-10-24 NOTE — PLAN OF CARE
Goal Outcome Evaluation:      Plan of Care Reviewed With: patient, spouse          Outcome Evaluation: Anticipate pt will discharge home with home RN, PT, OT

## 2024-10-24 NOTE — PROGRESS NOTES
[facility]    Medicine Progress Note - Hospitalist Service    Date of Admission:  10/22/2024    Assessment & Plan   Lobito Zheng is a 69 year old male with a past medical history documented below presented to the hospital for a robotic assisted laparoscopic cystoprostatectomy with bilateral pelvic lymph node dissection and extracorporeal ileal conduit urinary diversion and is s/p procedure, postprocedure medicine was consulted for medical comanagement.     Urothelial carcinoma  Postoperative ileus  -Presented to the hospital for robotic assisted laparoscopic cystoprostatectomy with bilateral pelvic lymph node dissection and extracorporeal ileal conduit urinary diversion  -Abdominal XR 10/23: Mild gaseous distention of the small bowel in the mid abdomen  -Urology is primary, monitoring drains and urostomy output  -Continue with pain management as per protocol  -Initially on amoxicillin, has been discontinued  -Surgical pathology report 10/22: Right ureter showed no evidence of malignancy, left ureter was negative for dysplasia, carcinoma in situ, and invasive carcinoma  -Clear liquid diet  -Continue for movantik to prevent constipation     Possible VAMSHI on CKD stage III  -Possibly a combination of prerenal in postrenal etiology  -Baseline creatinine ~1.2  -Current creatinine 1.48  -Discontinued IV fluids due to elevated blood pressures but will continue to monitor     Non-insulin-dependent type 2 diabetes mellitus-now with normal A1c on metformin 1000 mg twice daily at home  -10/22: Hemoglobin A1c 5.3%  -holding metformin  -stopped glucose checks and insulin as he was not requiring, may need to reassess when appetite improves    Hyperlipidemia  -Continue with rosuvastatin     Obstructive sleep apnea  -Continue CPAP     Anemia and thrombocytopenia  -Monitor CBC, follow-up iron panel, folate and vitamin B12 levels     Elevated blood pressure without a diagnosis of hypertension  -Monitor vital  "signs as per protocol  -IV hydralazine as needed for elevated blood pressure          Diet: Clear Liquid Diet    DVT Prophylaxis: Heparin SQ  Lombardo Catheter: Not present  Lines: None     Cardiac Monitoring: None  Code Status: Full Code      Clinically Significant Risk Factors                 # Thrombocytopenia: Lowest platelets = 125 in last 2 days, will monitor for bleeding             # Obesity: Estimated body mass index is 33.3 kg/m  as calculated from the following:    Height as of this encounter: 1.753 m (5' 9\").    Weight as of this encounter: 102.3 kg (225 lb 8 oz)., PRESENT ON ADMISSION            Disposition Plan     Medically Ready for Discharge: Anticipated in 2-4 Days             Regina Suarez MD  Hospitalist Service  [facility]  Securely message with [employer] (more info)  Text page via [employer] Paging/Directory   ______________________________________________________________________    Interval History   Mr. Zheng is in a lot of pain this morning. He has been sat up in a chair and was causing a lot of discomfort. Wife at bedside.  A1c is 5.3% and he is not requiring insulin so we will discontinue insulins and glucose checks.  He is on metformin outpatient.  Currently clear liquid diet.  He is opioid naïve but still requiring several doses of IV pain medication.  Abdomen continues to be distended with high output from the drain.    Physical Exam   Vital Signs: Temp: 98.1  F (36.7  C) Temp src: Oral BP: (!) 161/98 Pulse: 73   Resp: 18 SpO2: 95 % O2 Device: None (Room air)    Weight: 225 lbs 8 oz    General Appearance: Awake, alert, in pain  Respiratory: CTAB, no wheeze  Cardiovascular: RRR, no murmur noted  GI: Tender, taut, distended, serosanguineous output to drain, ileal conduit in place with good ostomy some mucosal drainage  Skin: no jaundice, no rash      Medical Decision Making       45 MINUTES SPENT BY ME on the date of service doing chart review, history, exam, " documentation & further activities per the note.      Data     I have personally reviewed the following data over the past 24 hrs:    11.8 (H)  \   11.1 (L)   / 163     140 106 22.0 /  105 (H)   4.4 24 1.47 (H) \       Imaging results reviewed over the past 24 hrs:   Recent Results (from the past 24 hours)   XR Abdomen Port 1 View    Narrative    EXAM: XR ABDOMEN PORT 1 VIEW  LOCATION: [facility]  DATE: 10/23/2024    INDICATION: POD#1 cystectomy with ileal conduit, please assess for bilateral ureteral stent positioning  COMPARISON: 3/5/2024      Impression    IMPRESSION: Lower pelvis is clipped.     Right double-J ureteral stent has been removed.    New, postsurgical changes are seen with numerous clips at port sites and in the lower midpelvis. Incomplete visualization of the left-sided pelvic drain extending to the right. Bilateral double-J ureteral stents which are exiting the right lower quadrant   ileal loop.    There is mild gaseous distention of small bowel loops in the midabdomen due to mild postoperative ileus.

## 2024-10-24 NOTE — PLAN OF CARE
Problem: Surgery Nonspecified  Goal: Effective Bowel Elimination  Outcome: Not Progressing  Goal: Optimal Pain Control and Function  Outcome: Not Progressing  Intervention: Prevent or Manage Pain  Recent Flowsheet Documentation  Taken 10/23/2024 2226 by Filipe Jett RN  Pain Management Interventions:   medication (see MAR)   emotional support  Taken 10/23/2024 1952 by Filipe Jett RN  Pain Management Interventions:   medication (see MAR)   emotional support     Problem: Surgery Nonspecified  Goal: Nausea and Vomiting Relief  Outcome: Progressing  Goal: Effective Urinary Elimination  Outcome: Progressing     [id number]  Goal Outcome Evaluation:  Dilaudid given twice for abdominal pain. Not passing gas yet. Ambulation offered but refused - pt says he will try in the morning. Having high KAVIN output; see provider notification note. Urostomy CDI with yellow/pink output. Lap sites CDI with small amount of drainage on midline. Tolerating clear liquids, no nausea. Able to make needs known.    Filipe Jett RN

## 2024-10-24 NOTE — PLAN OF CARE
Problem: Surgery Nonspecified  Goal: Optimal Pain Control and Function  Intervention: Prevent or Manage Pain  Recent Flowsheet Documentation  Taken 10/24/2024 1445 by Tito Cárdenas RN  Pain Management Interventions:   relaxation techniques promoted   emotional support   medication (see MAR)  Taken 10/24/2024 1108 by Tito Cárdenas RN  Pain Management Interventions:   medication (see MAR)   repositioned   relaxation techniques promoted   emotional support  Taken 10/24/2024 0957 by Tito Cárdenas RN  Pain Management Interventions: medication offered but refused  Taken 10/24/2024 0848 by Tito Cárdenas RN  Pain Management Interventions:   relaxation techniques promoted   cold applied   medication (see MAR)  Taken 10/24/2024 0746 by Tito Cárdenas RN  Pain Management Interventions:   medication (see MAR)   relaxation techniques promoted     Problem: Pain Acute  Goal: Optimal Pain Control and Function  Intervention: Develop Pain Management Plan  Recent Flowsheet Documentation  Taken 10/24/2024 1445 by Tito Cárdenas RN  Pain Management Interventions:   relaxation techniques promoted   emotional support   medication (see MAR)  Taken 10/24/2024 1108 by Tito Cárdenas RN  Pain Management Interventions:   medication (see MAR)   repositioned   relaxation techniques promoted   emotional support  Taken 10/24/2024 0957 by Tito Cárdenas RN  Pain Management Interventions: medication offered but refused  Taken 10/24/2024 0848 by Tito Cárdenas RN  Pain Management Interventions:   relaxation techniques promoted   cold applied   medication (see MAR)  Taken 10/24/2024 0746 by Tito Cárdenas RN  Pain Management Interventions:   medication (see MAR)   relaxation techniques promoted  Intervention: Prevent or Manage Pain  Recent Flowsheet Documentation  Taken 10/24/2024 1646 by Tito Cárdenas RN  Medication Review/Management: medications reviewed  Taken 10/24/2024 0900 by Tito Cárdenas  RN  Medication Review/Management: medications reviewed    Patient alert and oriented.  Complaining of abdominal pain today between a 7-10.  Current pain regime with some help.  Abdomin firm, distended and tender.  Bowel sounds hypoactive.  Stated he passed a very small amount of gas x 1 earlier today.  Incisions dry and intact.    Activity encouraged.  Patient has ambulated the hallways x 3.  Up in chair also.    KAVIN still putting out large amounts of serosanguineous drainage.  Doctors aware.    Urostomy intact and putting out pink ,yellow output.

## 2024-10-24 NOTE — PROVIDER NOTIFICATION
Paged Dr. Tejada at 2030 regarding high KAVIN output. Pt has had approximately 390 mL out in the past 4 hours with a total of 745 mL out all day. MD said UO is ok so she is not worried, it's likely d/t fluid shifts.    Filipe Jett RN

## 2024-10-24 NOTE — PROGRESS NOTES
"Urology Note  10/24/24    S: Patient reports abdominal distention and abdominal pain.  No flatus.  He is ambulating and has walked twice today.    O:  BP (!) 160/99 (BP Location: Right arm)   Pulse 92   Temp 98.1  F (36.7  C) (Oral)   Resp 20   Ht 1.753 m (5' 9\")   Wt 100.4 kg (221 lb 5.5 oz)   SpO2 93%   BMI 32.69 kg/m      No apparent distress, lying in bed  Abdomen is distended, tender to palpation, no rebound or guarding  Lap site incisions in the midline low incision are well-approximated with staples, no erythema or drainage urostomy in RLQ appears mildly congested and is draining alyssa urine    I/O  Urine output yesterday 3900 mL  Urine output today has been 1425 mL  KAVIN drain output yesterday was 1315 mL  KAVIN drain output thus far today has been 410 mL    Labs  10/24/2024  BMP with serum creatinine 1.47  KAVIN drain creatinine level 1.5 is consistent with serum)  CBC with white blood cell count 11.8, hemoglobin 11.1    Imaging  KUB x-ray on 10/23/2024 shows bilateral ureteral stents in good position as well as evidence of ileus    A/P:  69-year-old male with history of muscle invasive bladder cancer who is postoperative day #2 from robotic assisted radical cystoprostatectomy and extracorporeal ileal conduit urinary diversion.  Clinical exam is consistent with ileus postoperatively.  He is shifting fluids resulting in high drain outputs but he has adequate urine output and his labs look appropriate.    -Encourage ambulation  -N.p.o., okay for sips and chips  -Will restart low rate of IV fluids given NPO status and ileus  -Monitor for nausea/vomiting.  Would recommend stat abdominal x-ray if develops worsening distention, abdominal pain, or nausea and vomiting and would likely need placement of nasogastric tube pending this result    Dharmesh Lynch MD  [facility]  (p) [phone]               "

## 2024-10-24 NOTE — PLAN OF CARE
"Shift from 1500 to 1930-      Problem: Surgery Nonspecified  Goal: Effective Bowel Elimination  Outcome: Progressing     Problem: Surgery Nonspecified  Goal: Optimal Pain Control and Function  Outcome: Progressing  Intervention: Prevent or Manage Pain  Recent Flowsheet Documentation  Taken 10/23/2024 1815 by Erika Madrid RN  Pain Management Interventions: emotional support  Taken 10/23/2024 1752 by Erika Madrid RN  Pain Management Interventions:   medication (see MAR)   emotional support  Taken 10/23/2024 1744 by Erika Madrid RN  Pain Management Interventions:   emotional support   pain management plan reviewed with patient/caregiver   medication (see MAR)  Taken 10/23/2024 1515 by Erika Madrid RN  Pain Management Interventions:   relaxation techniques promoted   cold applied   emotional support   pain management plan reviewed with patient/caregiver     Problem: Surgery Nonspecified  Goal: Effective Urinary Elimination  Outcome: Progressing     Problem: Activity Intolerance  Goal: Enhanced Capacity and Energy  Outcome: Progressing  Intervention: Optimize Activity Tolerance  Recent Flowsheet Documentation  Taken 10/23/2024 1815 by Erika Madrid RN  Activity Management: bedrest  Taken 10/23/2024 1752 by Erika Madrid RN  Activity Management: bedrest  Taken 10/23/2024 1744 by Erika Madrid RN  Activity Management: bedrest  Taken 10/23/2024 1602 by Erika Madrid RN  Activity Management: activity adjusted per tolerance  Taken 10/23/2024 1544 by Erika Madrid RN  Activity Management: (wants pain meds instead) patient refuses activity  Taken 10/23/2024 1515 by Erika Madrid RN  Activity Management: bedrest       Goal Outcome Evaluation:    Patient pain 7-8/10; Given scheduled tylenol and IV dilaudid per order.     Binder at bedside for walking.     Pt wanted to walk after pain med \"kicked in.\" Then he wanted to drink clear liquid tray. Afterwards he didn't want to ambulate.     IVF Sl'd.   Incisions " C/D/I;     Urostomy stoma pink/red and draining alyssa urine to blake bag.    KAVIN output was 330ml of serosanguinous drainage.

## 2024-10-24 NOTE — PROGRESS NOTES
[facility] Nurse Inpatient Assessment     Consulted for: urostomy, ileal conduit    Summary: 10/24 - rounded on patient earlier in the day; patient had a lot of visitors so we set a time for the afternoon. At 1400 [facility] rounded on patient, he was sitting in the chair, wife at bedside. Patient was having severe pain and cramping and declined a pouch change today. Will reassess and attempt another hands on session tomorrow if able. Below assessment kept for continuity.   LORA Noriega RN CWOCN  Pager no longer in use, please contact through vushaper group: [facility] [facility]    Patient History (according to provider note(s):      Lobito Zheng is a 69 year old male with a past medical history documented below presented to the hospital for a robotic assisted laparoscopic cystoprostatectomy with bilateral pelvic lymph node dissection and extracorporeal ileal conduit urinary diversion and is s/p procedure     Assessment:      Areas visualized during today's visit: Abdomen    Assessment of new end Urostomy:  Diagnosis Pertinent to Stoma: Cancer - Bladder      Surgery Date: 10/22/2024  Surgeon:[facility]: [facility]  Pouching system in place on assessment today: Carmen two piece and cut to fit   Pouch barrier status: Edges lifting   Pouch last changed/wear time: 1 day  Reason for pouch change today: ostomy education and initial post-op assessment  Effectiveness of current pouching/ supply plan: Needs soft convexity at discharge   Change made with ostomy management today: No  Pouching system placed today: [place] two piece, cut to fit, and flat (abdomen very distended, once softened may need to adjust)  Supplies: gathered        Last photo: 10/23  Stoma location: RUQ  Stoma size: 1.25 high x 1.5 wide inches, Ureteral stents  Stoma appearance: beefy red, oval, moist, and edematous  Mucocutaneous junction:  intact  Peristomal  complication(s): none   Output: brown and alyssa  Output volume emptied during visit: 0 ml  Abdominal assessment: Distended and Firm  Surgical site(s): staples intact  NG still in place? No  Pain: Sharp and Stabbing  Is patient still on a PCA? No    Ostomy education assessment:  Participant of teaching session today: patient  and spouse  Education completed today: Initial fitting, Stoma assessment, Pouching system assessment , Refitting of appliance , Pouch change demonstration, Ostomy accessory product use , Introduction to pouches, Peristomal skin care, Pouch emptying demonstration, and Importance of hydration  Educational materials/methods: Verbal, Written, Demonstration, and Addressed patient/caregiver questions/concerns  Education still needed: Pouch change return demonstration, Pouch emptying return demonstration, Intake and output recording, Fluid and electrolyte balance , When to seek medical attention, Odor/flatus management , Infection prevention/hygiene , Hernia prevention, Lifestyle adjustments , and Discharge instructions  Learning Comprehension:   Psychosocial assessment: ready  Patient readiness for education today: observing and attentive  Following today's visit: patient  and spouse is able to demonstrate;         1. How to empty their pouch? Yes and with minimal assist        2. How to change their pouch? No and Needs additional practice         3. How to read and record intake and output correctly? No  Preparation for discharge completed: Placed prescription recommendations in discharge navigator for MD to sign and Ordered samples from  after gaining consent from patient/caregiver  Preparation for discharge still needed: Ensured patient has extra supplies for discharge, Discussed how to order supplies after discharge , Discussed how and when to make an outpatient WOC nurse appointment after discharge, Prepared for discharge home with home care, and Discuss signs/symptoms of when to seek  "medical attention  Pt support system on discharge: family  WOC recommend home care? Yes  Face to face time: 45 minutes           Treatment Plan:     RUQ Urostomy pouching plan:   Pouching system: ostomy supplies pouches: Carmen Urine 2 pc ([id number])  ostomy supplies barrier: [place] 57mm FLAT ([id number])  Accessories used: WOC ostomy accessories: 2\" Cera Barrier Ring ([id number])   Frequency of pouch changes: Twice weekly and PRN leakage  WOC follow up plan: Daily Monday-Friday (as able)  Bedside RN interventions: Change pouch PRN if leaking using the supplies above, Empty pouch when 1/3 to 1/2 full, ensure to clean pouch outlet after emptying to prevent odor, Notify WOC for ongoing pouch leakage, Document stoma appearance and output volume, color, and consistency every shift, and Encourage patient to empty pouch with assist      Orders: Written    RECOMMEND PRIMARY TEAM ORDER: None, at this time  Education provided: plan of care  Discussed plan of care with: Patient and Family  WOC nurse follow-up plan: daily  Notify WOC if wound(s) deteriorate.  Nursing to notify the Provider(s) and re-consult the WOC Nurse if new skin concern.    DATA:     Current support surface: Standard  Standard gel mattress (Isoflex)  Containment of urine/stool: Continent of bowel and Urostomy pouch  BMI: Body mass index is 32.69 kg/m .   Active diet order: Orders Placed This Encounter      NPO for Medical/Clinical Reasons Except for: Ice Chips, Meds     Output: I/O last 3 completed shifts:  In: 1649.16 [P.O.:585; I.V.:1064.16]  Out: 5215 [Urine:3900; Drains:1315]     Labs:   Recent Labs   Lab 10/24/24  0630 10/23/24  0604 10/22/24  1738   HGB 11.1*   < > 10.4*   WBC 11.8*   < > 12.2*   A1C  --   --  5.3    < > = values in this interval not displayed.     Pressure injury risk assessment:   Sensory Perception: 4-->no impairment  Moisture: 4-->rarely moist  Activity: 3-->walks occasionally  Mobility: 3-->slightly limited  Nutrition: " 3-->adequate  Friction and Shear: 2-->potential problem  Cesar Score: 19    ARCHIE McclellandN RN CWOCN  Pager no longer in use, please contact through [employer] group: [facility]

## 2024-10-24 NOTE — PROGRESS NOTES
"  [facility] - POSTOP PROGRESS NOTE    PLACE OF SERVICE:  [facility]     SURGERY: POD #2 after Robotic-assisted laparoscopic radical cystoprostatectomy, Bilateral pelvic lymphadenectomy, Ileal conduit urinary diversion by Dr. Lynch for urothelial cell carcinoma      SUBJECTIVE:   Events: no acute events overnight    Pt c/o abdominal pain this AM, somewhat improved with pain medication. Denies nausea, vomiting, fever, chills, SOB, chest pain, LE pain. Tolerating clear liquids. Not yet passing gas. Up to chair this AM. Walked yesterday but not yet today.     Spouse and family members present.     OBJECTIVE:  PHYSICAL EXAM  Vital signs:  Temp: 98.1  F (36.7  C) Temp src: Oral BP: (!) 161/98 Pulse: 73   Resp: 18 SpO2: 95 % O2 Device: None (Room air) Oxygen Delivery: 1.5 LPM Height: 175.3 cm (5' 9\") Weight: 102.3 kg (225 lb 8 oz)  Estimated body mass index is 33.3 kg/m  as calculated from the following:    Height as of this encounter: 1.753 m (5' 9\").    Weight as of this encounter: 102.3 kg (225 lb 8 oz).    General: NAD, alert, cooperative  Neuro: A&O x 3. Moving all extremities equally.   Resp: reg resp rate/depth.   Abdomen: distended and tender abdomen. Urostomy stoma is beefy red in color, ureterostomy tube tips x 2 noted extending from stoma. Urostomy draining yellow urine, 1100 ml so far today and 3625 ml on 10/23.  KAVIN draining serosanguinous fluid, 515 ml so far today and 1055 ml on 10/23.   Incisions: C/D/I, closed with staples, no significant ecchymosis noted. Telfa replaced over midline incision.   LE: CWMS, no bilateral LE edema.     LABS:  No results found for: \"INR\"   Lab Results   Component Value Date    WBC 11.8 10/24/2024     Lab Results   Component Value Date    RBC 3.78 10/24/2024     Lab Results   Component Value Date    HGB 11.1 10/24/2024     Lab Results   Component Value Date    HCT 35.5 10/24/2024     Lab Results   Component Value Date    MCV 94 10/24/2024     Lab Results "   Component Value Date    MCH 29.4 10/24/2024     Lab Results   Component Value Date    MCHC 31.3 10/24/2024     Lab Results   Component Value Date    RDW 17.3 10/24/2024     Lab Results   Component Value Date     10/24/2024     Creatinine   Date Value Ref Range Status   10/24/2024 1.47 (H) 0.67 - 1.17 mg/dL Final     Sodium   Date Value Ref Range Status   10/24/2024 140 135 - 145 mmol/L Final     Potassium   Date Value Ref Range Status   10/24/2024 4.4 3.4 - 5.3 mmol/L Final     Magnesium   Date Value Ref Range Status   10/23/2024 2.3 1.7 - 2.3 mg/dL Final     Lab Results: personally reviewed.     IMAGING:  EXAM: XR ABDOMEN PORT 1 VIEW  LOCATION: [facility]  DATE: 10/23/2024     INDICATION: POD#1 cystectomy with ileal conduit, please assess for bilateral ureteral stent positioning  COMPARISON: 3/5/2024                                                                      IMPRESSION: Lower pelvis is clipped.      Right double-J ureteral stent has been removed.     New, postsurgical changes are seen with numerous clips at port sites and in the lower midpelvis. Incomplete visualization of the left-sided pelvic drain extending to the right. Bilateral double-J ureteral stents which are exiting the right lower quadrant   ileal loop.     There is mild gaseous distention of small bowel loops in the midabdomen due to mild postoperative ileus.    ASSESSMENT/PLAN:  POD #2 after Robotic-assisted laparoscopic radical cystoprostatectomy, Bilateral pelvic lymphadenectomy, Ileal conduit urinary diversion by Dr. Lynch for urothelial cell carcinoma      - Abdomen more distended today, tender. Not yet passing gas. Post-op ileus noted on XR 10/23.  Writer or Dr. Lynch will re-evaluate this afternoon.   - Diet - return to NPO with sips/chips.    - Drains -  draining serosanguinous fluid, 515 ml so far today and 1055 ml on 10/23. Fluid creatinine ordered/pending.   - Creatinine 1.47 today, 1.34 on  10/23 (baseline 1.42-1.48). Appreciate IVF per Hospitalist, off since 10/23.   - Hgb improving 11.1.   - Urostomy output 1100 ml so far today and 3625 ml on 10/23.   - Labs ordered - CBC and BMP in AM.   - Activity - encourage ambulation and incentive spirometer. Appreciate PT/OT recommendations.   - DVT prophylaxis: SCDs, subcutaneous heparin, ambulation   - Bowels: ambulation encouraged. Consider Miralax beginning 10/25 pending bowel activity (on naloxegol).   - Appreciate Hospitalist assistance with medical management.   - Appreciate WO RN assistance with urostomy management/teaching.   - Anticipated discharge: 5-7 days.     Discussed patient with  Dr. Lynch and Dr. Erick Rehman, APRN, CNP  [facility]   [phone]

## 2024-10-24 NOTE — PROGRESS NOTES
Patient has hospital cpap in room. Patient declined to wear cpap tonight. RT will continue to monitor.

## 2024-10-24 NOTE — CONSULTS
Care Management Initial Consult    General Information  Assessment completed with: Patient, Spouse or significant other,    Type of CM/SW Visit: Initial Assessment    Primary Care Provider verified and updated as needed: Yes   Readmission within the last 30 days: no previous admission in last 30 days      Reason for Consult: discharge planning  Advance Care Planning: Advance Care Planning Reviewed: no concerns identified          Communication Assessment  Patient's communication style: spoken language (English or Bilingual)    Hearing Difficulty or Deaf: no   Wear Glasses or Blind: yes    Cognitive  Cognitive/Neuro/Behavioral: WDL  Level of Consciousness: alert  Arousal Level: opens eyes spontaneously  Orientation: oriented x 4  Mood/Behavior: behavior appropriate to situation     Speech: spontaneous    Living Environment:   People in home: spouse     Current living Arrangements: house      Able to return to prior arrangements: yes       Family/Social Support:  Care provided by: self  Provides care for: no one  Marital Status:   Support system:            Description of Support System:           Current Resources:   Patient receiving home care services: No        Community Resources: None  Equipment currently used at home: none  Supplies currently used at home: None    Employment/Financial:  Employment Status: retired        Financial Concerns: none           Does the patient's insurance plan have a 3 day qualifying hospital stay waiver?  No    Lifestyle & Psychosocial Needs:  Social Drivers of Health     Food Insecurity: Low Risk  (10/23/2024)    Food Insecurity     Within the past 12 months, did you worry that your food would run out before you got money to buy more?: No     Within the past 12 months, did the food you bought just not last and you didn t have money to get more?: No   Depression: Not on file   Housing Stability: Low Risk  (10/23/2024)    Housing Stability     Do you have housing? : Yes      Are you worried about losing your housing?: No   Tobacco Use: Medium Risk (10/22/2024)    Patient History     Smoking Tobacco Use: Former     Smokeless Tobacco Use: Never     Passive Exposure: Not on file   Financial Resource Strain: Low Risk  (10/23/2024)    Financial Resource Strain     Within the past 12 months, have you or your family members you live with been unable to get utilities (heat, electricity) when it was really needed?: No   Alcohol Use: Not on file   Transportation Needs: Low Risk  (10/23/2024)    Transportation Needs     Within the past 12 months, has lack of transportation kept you from medical appointments, getting your medicines, non-medical meetings or appointments, work, or from getting things that you need?: No   Physical Activity: Not on file   Interpersonal Safety: Low Risk  (10/22/2024)    Interpersonal Safety     Do you feel physically and emotionally safe where you currently live?: Yes     Within the past 12 months, have you been hit, slapped, kicked or otherwise physically hurt by someone?: No     Within the past 12 months, have you been humiliated or emotionally abused in other ways by your partner or ex-partner?: No   Stress: Not on file   Social Connections: Socially Integrated (5/16/2024)    [facility]    Social Connections     Frequency of Communication with Friends and Family: 0   Health Literacy: Not on file       Functional Status:  Prior to admission patient needed assistance:   Dependent ADLs:: Independent  Dependent IADLs:: Independent       Mental Health Status:  Mental Health Status: No Current Concerns       Chemical Dependency Status:  Chemical Dependency Status: No Current Concerns             Values/Beliefs:  Spiritual, Cultural Beliefs, Christian Practices, Values that affect care: no               Discussed  Partnership in Safe Discharge Planning  document with patient/family: No    Additional Information:  CM met with  pt, wife in room to discuss discharge planning and complete initial assessment.     Pt lives in a house with wife . Pt does not use an assistive device. Pt is independent at baseline.  Therapy rec is home with home care. Pt and wife agreeable. Home care RN, PT, OT referrals sent and pending . Family to transport.      Next Steps: obtain home care    1:50 PM  [facility], and [facility] can all accept pt. Pt's agency of choice of [facility]. Per [facility], [facility] will need to send over [facility] care orders to them prior to them starting services. CM updated pt and wife, pt's wife stated she called and let the [facility] clinic ([phone]) know and requested orders to be sent to [facility].    CM called [facility] and confirmed that they have requested home care orders from pt's PCP, but it may take a few days to get approved.    Dian Marie, ARCHIEW

## 2024-10-25 ENCOUNTER — APPOINTMENT (OUTPATIENT)
Dept: OCCUPATIONAL THERAPY | Facility: HOSPITAL | Age: 69
DRG: 653 | End: 2024-10-25
Attending: STUDENT IN AN ORGANIZED HEALTH CARE EDUCATION/TRAINING PROGRAM
Payer: COMMERCIAL

## 2024-10-25 ENCOUNTER — APPOINTMENT (OUTPATIENT)
Dept: PHYSICAL THERAPY | Facility: HOSPITAL | Age: 69
DRG: 653 | End: 2024-10-25
Attending: STUDENT IN AN ORGANIZED HEALTH CARE EDUCATION/TRAINING PROGRAM
Payer: COMMERCIAL

## 2024-10-25 ENCOUNTER — APPOINTMENT (OUTPATIENT)
Dept: CT IMAGING | Facility: HOSPITAL | Age: 69
DRG: 653 | End: 2024-10-25
Attending: STUDENT IN AN ORGANIZED HEALTH CARE EDUCATION/TRAINING PROGRAM
Payer: COMMERCIAL

## 2024-10-25 LAB
ANION GAP SERPL CALCULATED.3IONS-SCNC: 16 MMOL/L (ref 7–15)
BUN SERPL-MCNC: 20 MG/DL (ref 8–23)
CALCIUM SERPL-MCNC: 9.1 MG/DL (ref 8.8–10.4)
CHLORIDE SERPL-SCNC: 103 MMOL/L (ref 98–107)
CREAT SERPL-MCNC: 1.21 MG/DL (ref 0.67–1.17)
EGFRCR SERPLBLD CKD-EPI 2021: 66 ML/MIN/1.73M2
ERYTHROCYTE [DISTWIDTH] IN BLOOD BY AUTOMATED COUNT: 16.5 % (ref 10–15)
ERYTHROCYTE [DISTWIDTH] IN BLOOD BY AUTOMATED COUNT: 16.6 % (ref 10–15)
GLUCOSE SERPL-MCNC: 138 MG/DL (ref 70–99)
HCO3 SERPL-SCNC: 19 MMOL/L (ref 22–29)
HCT VFR BLD AUTO: 38.1 % (ref 40–53)
HCT VFR BLD AUTO: 40 % (ref 40–53)
HGB BLD-MCNC: 12.3 G/DL (ref 13.3–17.7)
HGB BLD-MCNC: 13 G/DL (ref 13.3–17.7)
MCH RBC QN AUTO: 29.6 PG (ref 26.5–33)
MCH RBC QN AUTO: 29.7 PG (ref 26.5–33)
MCHC RBC AUTO-ENTMCNC: 32.3 G/DL (ref 31.5–36.5)
MCHC RBC AUTO-ENTMCNC: 32.5 G/DL (ref 31.5–36.5)
MCV RBC AUTO: 91 FL (ref 78–100)
MCV RBC AUTO: 92 FL (ref 78–100)
PLATELET # BLD AUTO: 206 10E3/UL (ref 150–450)
PLATELET # BLD AUTO: 230 10E3/UL (ref 150–450)
POTASSIUM SERPL-SCNC: 4.3 MMOL/L (ref 3.4–5.3)
RBC # BLD AUTO: 4.14 10E6/UL (ref 4.4–5.9)
RBC # BLD AUTO: 4.39 10E6/UL (ref 4.4–5.9)
SODIUM SERPL-SCNC: 138 MMOL/L (ref 135–145)
WBC # BLD AUTO: 20.8 10E3/UL (ref 4–11)
WBC # BLD AUTO: 24.5 10E3/UL (ref 4–11)

## 2024-10-25 PROCEDURE — 250N000011 HC RX IP 250 OP 636: Performed by: STUDENT IN AN ORGANIZED HEALTH CARE EDUCATION/TRAINING PROGRAM

## 2024-10-25 PROCEDURE — 97116 GAIT TRAINING THERAPY: CPT | Mod: GP

## 2024-10-25 PROCEDURE — 85027 COMPLETE CBC AUTOMATED: CPT | Performed by: STUDENT IN AN ORGANIZED HEALTH CARE EDUCATION/TRAINING PROGRAM

## 2024-10-25 PROCEDURE — 74177 CT ABD & PELVIS W/CONTRAST: CPT

## 2024-10-25 PROCEDURE — 80048 BASIC METABOLIC PNL TOTAL CA: CPT | Performed by: STUDENT IN AN ORGANIZED HEALTH CARE EDUCATION/TRAINING PROGRAM

## 2024-10-25 PROCEDURE — 97535 SELF CARE MNGMENT TRAINING: CPT | Mod: GO

## 2024-10-25 PROCEDURE — 87186 SC STD MICRODIL/AGAR DIL: CPT | Performed by: STUDENT IN AN ORGANIZED HEALTH CARE EDUCATION/TRAINING PROGRAM

## 2024-10-25 PROCEDURE — 36415 COLL VENOUS BLD VENIPUNCTURE: CPT | Performed by: STUDENT IN AN ORGANIZED HEALTH CARE EDUCATION/TRAINING PROGRAM

## 2024-10-25 PROCEDURE — 97530 THERAPEUTIC ACTIVITIES: CPT | Mod: GP

## 2024-10-25 PROCEDURE — 250N000013 HC RX MED GY IP 250 OP 250 PS 637: Performed by: INTERNAL MEDICINE

## 2024-10-25 PROCEDURE — 250N000013 HC RX MED GY IP 250 OP 250 PS 637: Performed by: STUDENT IN AN ORGANIZED HEALTH CARE EDUCATION/TRAINING PROGRAM

## 2024-10-25 PROCEDURE — 250N000013 HC RX MED GY IP 250 OP 250 PS 637: Performed by: HOSPITALIST

## 2024-10-25 PROCEDURE — G0463 HOSPITAL OUTPT CLINIC VISIT: HCPCS

## 2024-10-25 PROCEDURE — 999N000157 HC STATISTIC RCP TIME EA 10 MIN

## 2024-10-25 PROCEDURE — 94660 CPAP INITIATION&MGMT: CPT

## 2024-10-25 PROCEDURE — 99232 SBSQ HOSP IP/OBS MODERATE 35: CPT | Performed by: HOSPITALIST

## 2024-10-25 PROCEDURE — 120N000001 HC R&B MED SURG/OB

## 2024-10-25 RX ORDER — HYDRALAZINE HYDROCHLORIDE 20 MG/ML
10 INJECTION INTRAMUSCULAR; INTRAVENOUS EVERY 4 HOURS PRN
Status: DISCONTINUED | OUTPATIENT
Start: 2024-10-25 | End: 2024-10-31 | Stop reason: HOSPADM

## 2024-10-25 RX ORDER — AMOXICILLIN 250 MG
1 CAPSULE ORAL 2 TIMES DAILY
Status: DISCONTINUED | OUTPATIENT
Start: 2024-10-25 | End: 2024-10-29

## 2024-10-25 RX ORDER — FUROSEMIDE 10 MG/ML
20 INJECTION INTRAMUSCULAR; INTRAVENOUS ONCE
Status: DISCONTINUED | OUTPATIENT
Start: 2024-10-25 | End: 2024-10-25

## 2024-10-25 RX ORDER — PIPERACILLIN SODIUM, TAZOBACTAM SODIUM 3; .375 G/15ML; G/15ML
3.38 INJECTION, POWDER, LYOPHILIZED, FOR SOLUTION INTRAVENOUS EVERY 8 HOURS
Status: DISCONTINUED | OUTPATIENT
Start: 2024-10-25 | End: 2024-10-25

## 2024-10-25 RX ORDER — PIPERACILLIN SODIUM, TAZOBACTAM SODIUM 3; .375 G/15ML; G/15ML
3.38 INJECTION, POWDER, LYOPHILIZED, FOR SOLUTION INTRAVENOUS ONCE
Status: COMPLETED | OUTPATIENT
Start: 2024-10-25 | End: 2024-10-25

## 2024-10-25 RX ORDER — PIPERACILLIN SODIUM, TAZOBACTAM SODIUM 3; .375 G/15ML; G/15ML
3.38 INJECTION, POWDER, LYOPHILIZED, FOR SOLUTION INTRAVENOUS EVERY 8 HOURS
Status: DISCONTINUED | OUTPATIENT
Start: 2024-10-26 | End: 2024-10-27

## 2024-10-25 RX ORDER — FUROSEMIDE 10 MG/ML
10 INJECTION INTRAMUSCULAR; INTRAVENOUS ONCE
Status: COMPLETED | OUTPATIENT
Start: 2024-10-25 | End: 2024-10-25

## 2024-10-25 RX ORDER — IOPAMIDOL 755 MG/ML
90 INJECTION, SOLUTION INTRAVASCULAR ONCE
Status: COMPLETED | OUTPATIENT
Start: 2024-10-25 | End: 2024-10-25

## 2024-10-25 RX ORDER — BISACODYL 10 MG
10 SUPPOSITORY, RECTAL RECTAL EVERY EVENING
Status: DISCONTINUED | OUTPATIENT
Start: 2024-10-25 | End: 2024-10-31 | Stop reason: HOSPADM

## 2024-10-25 RX ADMIN — METHOCARBAMOL 500 MG: 500 TABLET ORAL at 06:40

## 2024-10-25 RX ADMIN — ACETAMINOPHEN 975 MG: 325 TABLET ORAL at 02:01

## 2024-10-25 RX ADMIN — HYDROMORPHONE HYDROCHLORIDE 0.4 MG: 0.2 INJECTION, SOLUTION INTRAMUSCULAR; INTRAVENOUS; SUBCUTANEOUS at 13:55

## 2024-10-25 RX ADMIN — PANTOPRAZOLE SODIUM 40 MG: 40 TABLET, DELAYED RELEASE ORAL at 05:43

## 2024-10-25 RX ADMIN — FUROSEMIDE 10 MG: 10 INJECTION, SOLUTION INTRAMUSCULAR; INTRAVENOUS at 10:53

## 2024-10-25 RX ADMIN — NALOXEGOL OXALATE 12.5 MG: 12.5 TABLET, FILM COATED ORAL at 05:43

## 2024-10-25 RX ADMIN — HYDROMORPHONE HYDROCHLORIDE 2 MG: 2 TABLET ORAL at 06:41

## 2024-10-25 RX ADMIN — SENNOSIDES AND DOCUSATE SODIUM 1 TABLET: 8.6; 5 TABLET ORAL at 21:10

## 2024-10-25 RX ADMIN — HYDROMORPHONE HYDROCHLORIDE 0.4 MG: 0.2 INJECTION, SOLUTION INTRAMUSCULAR; INTRAVENOUS; SUBCUTANEOUS at 05:41

## 2024-10-25 RX ADMIN — HYDROMORPHONE HYDROCHLORIDE 3 MG: 2 TABLET ORAL at 10:52

## 2024-10-25 RX ADMIN — ROSUVASTATIN CALCIUM 40 MG: 40 TABLET, FILM COATED ORAL at 21:10

## 2024-10-25 RX ADMIN — HYDROMORPHONE HYDROCHLORIDE 2 MG: 2 TABLET ORAL at 02:01

## 2024-10-25 RX ADMIN — PIPERACILLIN AND TAZOBACTAM 3.38 G: 3; .375 INJECTION, POWDER, FOR SOLUTION INTRAVENOUS at 21:16

## 2024-10-25 RX ADMIN — HYDRALAZINE HYDROCHLORIDE 10 MG: 20 INJECTION INTRAMUSCULAR; INTRAVENOUS at 16:09

## 2024-10-25 RX ADMIN — ACETAMINOPHEN 975 MG: 325 TABLET ORAL at 09:02

## 2024-10-25 RX ADMIN — SENNOSIDES AND DOCUSATE SODIUM 1 TABLET: 8.6; 5 TABLET ORAL at 13:55

## 2024-10-25 RX ADMIN — IOPAMIDOL 90 ML: 755 INJECTION, SOLUTION INTRAVENOUS at 19:20

## 2024-10-25 RX ADMIN — BISACODYL 10 MG: 10 SUPPOSITORY RECTAL at 21:10

## 2024-10-25 RX ADMIN — HEPARIN SODIUM 5000 UNITS: 10000 INJECTION, SOLUTION INTRAVENOUS; SUBCUTANEOUS at 16:15

## 2024-10-25 RX ADMIN — HEPARIN SODIUM 5000 UNITS: 10000 INJECTION, SOLUTION INTRAVENOUS; SUBCUTANEOUS at 09:02

## 2024-10-25 RX ADMIN — HYDROMORPHONE HYDROCHLORIDE 3 MG: 2 TABLET ORAL at 17:25

## 2024-10-25 NOTE — PROGRESS NOTES
"Patient using hosp CPAP unit here. Fitted with large mask face. On RA, Sp02 mid 90s. Will continue to follow nighly.      10/24/24 7725   Mode: CPAP/ BiPAP/ AVAPS/ AVAPS AE   CPAP/BiPAP/ AVAPS/ AVAPS AE Mode CPAP   Equipment   Device Hosp Unit   CPAP/BiPAP/Settings   $CPAP/BiPAP Subsequent completed   BIPAP/CPAP On Standby On   IPAP/EPAP (cmH2O) Auto 5-20   Oxygen (%) 21       BP (!) 160/74 (BP Location: Left arm)   Pulse 96   Temp 97.8  F (36.6  C) (Oral)   Resp 16   Ht 1.753 m (5' 9\")   Wt 100.4 kg (221 lb 5.5 oz)   SpO2 97%   BMI 32.69 kg/m       Michael Avilez, RRT  "

## 2024-10-25 NOTE — PLAN OF CARE
Reports feeling better this morning, with less pain for a change. Passed gas more than once this morning. Red but fairly clear output of candy drain. No nausea currently, tolerating sips of water. Minimal drainage from midline surgical site present on dressing. PRN oxy before therapy. I encouraged the use of the incentive spirometer, and he performed 6 good breaths with it. Tolerating sips of water. Walked with therapy in the baker. Passing gas.

## 2024-10-25 NOTE — PROGRESS NOTES
[facility]  [facility] Nurse Inpatient Assessment     Consulted for: urostomy, ileal conduit    Summary: 10/25 Patient will need to work with [facility] to get supplies, advised spouse to begin process and see what [facility] requires for DME ordering.    10/24 - rounded on patient earlier in the day; patient had a lot of visitors so we set a time for the afternoon. At 1400 woc rounded on patient, he was sitting in the chair, wife at bedside. Patient was having severe pain and cramping and declined a pouch change today. Will reassess and attempt another hands on session tomorrow if able. Below assessment kept for continuity.       Patient History (according to provider note(s):      Lobito Zheng is a 69 year old male with a past medical history documented below presented to the hospital for a robotic assisted laparoscopic cystoprostatectomy with bilateral pelvic lymph node dissection and extracorporeal ileal conduit urinary diversion and is s/p procedure     Assessment:      Areas visualized during today's visit: Abdomen    Assessment of new end Urostomy:  Diagnosis Pertinent to Stoma: Cancer - Bladder      Surgery Date: 10/22/2024  Sur[facility]: [facility]  Pouching system in place on assessment today: Carmen two piece and cut to fit   Pouch barrier status: Edges lifting   Pouch last changed/wear time: 2 days  Reason for pouch change today: ostomy education and routine schedule  Effectiveness of current pouching/ supply plan: Needs soft convexity at discharge   Change made with ostomy management today: No  Pouching system placed today: Carmen two piece, cut to fit, and flat (abdomen very distended, once softened may need to adjust)  Supplies: gathered         Last photo: 10/25  Stoma location: RUQ  Stoma size: 1 1/8 high x 1.25 wide inches, Ureteral stents  Stoma appearance: beefy red, oval, moist, and edematous  Mucocutaneous junction:  intact  Peristomal complication(s): none    Output: brown and alyssa  Output volume emptied during visit: 0 ml  Abdominal assessment: Distended and Firm  Surgical site(s): staples intact  NG still in place? No  Pain: Sharp and Stabbing  Is patient still on a PCA? No    Ostomy education assessment:  Participant of teaching session today: patient  and spouse  Education completed today: Stoma assessment, Pouching system assessment , Refitting of appliance , Pouch change demonstration, Fluid and electrolyte balance , Importance of hydration, When to seek medical attention, and Discharge instructions  Educational materials/methods: Verbal, Written, Demonstration, and Addressed patient/caregiver questions/concerns  Education still needed: Pouch change return demonstration, Pouch emptying return demonstration, When to seek medical attention, Lifestyle adjustments , and Discharge instructions  Learning Comprehension:   Psychosocial assessment: ready but in a lot of pain  Patient readiness for education today: observing and attentive  Following today's visit: patient  and spouse is able to demonstrate;         1. How to empty their pouch? Yes and with minimal assist        2. How to change their pouch? Yes, with moderate assist, and Needs additional practice         3. How to read and record intake and output correctly? No  Preparation for discharge completed: Placed prescription recommendations in discharge navigator for MD to sign, Discussed how to order supplies after discharge , Ordered samples from  after gaining consent from patient/caregiver, and Prepared for discharge home with home care  Preparation for discharge still needed: Ensured patient has extra supplies for discharge, Discussed how and when to make an outpatient WOC nurse appointment after discharge, and Discuss signs/symptoms of when to seek medical attention  Pt support system on discharge: family  WOC recommend home care? Yes  Face to face time: 35 minutes           Treatment Plan:  "    RUQ Urostomy pouching plan:   Pouching system: ostomy supplies pouches: Carmen Urine 2 pc ([id number])  ostomy supplies barrier: Carmen 57mm FLAT ([id number])  Accessories used: WOC ostomy accessories: 2\" Cera Barrier Ring ([id number])   Frequency of pouch changes: Twice weekly and PRN leakage  WOC follow up plan: Daily Monday-Friday (as able)  Bedside RN interventions: Change pouch PRN if leaking using the supplies above, Empty pouch when 1/3 to 1/2 full, ensure to clean pouch outlet after emptying to prevent odor, Notify WOC for ongoing pouch leakage, Document stoma appearance and output volume, color, and consistency every shift, and Encourage patient to empty pouch with assist      Orders: Reviewed    RECOMMEND PRIMARY TEAM ORDER: None, at this time  Education provided: plan of care  Discussed plan of care with: Patient and Family  WOC nurse follow-up plan: daily  Notify WOC if wound(s) deteriorate.  Nursing to notify the Provider(s) and re-consult the WOC Nurse if new skin concern.    DATA:     Current support surface: Standard  Standard gel mattress (Isoflex)  Containment of urine/stool: Continent of bowel and Urostomy pouch  BMI: Body mass index is 31.82 kg/m .   Active diet order: Orders Placed This Encounter      NPO for Medical/Clinical Reasons Except for: Ice Chips, Meds     Output: I/O last 3 completed shifts:  In: 1137 [P.O.:570; I.V.:567]  Out: 3570 [Urine:2575; Drains:995]     Labs:   Recent Labs   Lab 10/25/24  1013 10/23/24  0604 10/22/24  1738   HGB 12.3*   < > 10.4*   WBC 20.8*   < > 12.2*   A1C  --   --  5.3    < > = values in this interval not displayed.     Pressure injury risk assessment:   Sensory Perception: 4-->no impairment  Moisture: 4-->rarely moist  Activity: 3-->walks occasionally  Mobility: 3-->slightly limited  Nutrition: 1-->very poor  Friction and Shear: 3-->no apparent problem  Cesar Score: 18    ARCHIE McclellandN RN CWOCN  Pager no longer in use, please contact through [employer] "   Tea group: [facility] [place]

## 2024-10-25 NOTE — PROGRESS NOTES
"[facility]    Medicine Progress Note - Hospitalist Service    Date of Admission:  10/22/2024    Assessment & Plan   69-year-old male with urothelial carcinoma admitted for surgery.  Postop course complicated by ileus.    #Urothelial carcinoma  S/p robotic assisted laparoscopic cystoprostatectomy with bilateral pelvic lymph node dissection and extracorporeal ileal conduit urinary diversion 10/22  Urology primary  s/p amoxicillin  Pain control    #Postop ileus  N.p.o. with ice chips, sips of clear liquid    #VAMSHI on CKD stage II  #Anion gap metabolic acidosis  S/p IV fluids  Monitor    #Elevated blood pressure  Likely due to pain, no prior history of hypertension  As needed IV hydralazine    #History of diabetes type 2  A1c 5.3  Holding metformin  Stopped glucose checks     #KRYS  CPAP      VTE ppx: sc Heparin        Diet: NPO for Medical/Clinical Reasons Except for: Ice Chips, Meds    Lombardo Catheter: Not present  Lines: None     Cardiac Monitoring: None  Code Status: Full Code      Clinically Significant Risk Factors                             # Obesity: Estimated body mass index is 31.82 kg/m  as calculated from the following:    Height as of this encounter: 1.753 m (5' 9\").    Weight as of this encounter: 97.8 kg (215 lb 8 oz)., PRESENT ON ADMISSION     # Financial/Environmental Concerns: none         Disposition Plan     Medically Ready for Discharge: Anticipated in 2-4 Days             Carter Mendoza DO  Hospitalist Service  [facility]  Securely message with [employer] (more info)  Text page via [employer] Paging/Directory   ______________________________________________________________________    Interval History   Reports small amount of flatus today. Pain and abdomen distension slightly improved    Physical Exam   Vital Signs: Temp: 97.7  F (36.5  C) Temp src: Oral BP: (!) 178/96 Pulse: 100   Resp: 18 SpO2: 93 % O2 Device: None (Room air)    Weight: 215 lbs 8 " oz    General Appearance:  No acute distress  Respiratory: Clear to auscultation bilaterally  Cardiovascular: Regular rate and rhythm  GI: Normal bowel sounds, abdomen is soft and distended    Medical Decision Making             Data

## 2024-10-25 NOTE — PLAN OF CARE
Problem: Pain Acute  Goal: Optimal Pain Control and Function  Outcome: Progressing     Problem: Surgery Nonspecified  Goal: Effective Oxygenation and Ventilation  Outcome: Progressing     Problem: Surgery Nonspecified  Goal: Effective Urinary Elimination  Outcome: Progressing     Problem: Surgery Nonspecified  Goal: Nausea and Vomiting Relief  Outcome: Progressing    Patient alert and oriented. BP elevated. Pain 8/10. PRN dilaudid given routinely. See MAR. PRN robaxin given. Abdomen distended, firm, and tender. Denies N/V. No reports of gas passed on shift. Bowel sounds audible. KAVIN drain large amount of serosanguineous drainage. KAVIN dressing changed on shift. Urostomy output alyssa colored. IVF @ 50ml/hr. CPAP on and off throughout night. Patient expresses no further concerns at this time. Call light within reach.    Park Pereira RN  [id number]

## 2024-10-25 NOTE — PROGRESS NOTES
"  [facility] - POSTOP PROGRESS NOTE    PLACE OF SERVICE:  [facility]     SURGERY: POD #3 after Robotic-assisted laparoscopic radical cystoprostatectomy, Bilateral pelvic lymphadenectomy, Ileal conduit urinary diversion by Dr. Lynch for urothelial cell carcinoma      SUBJECTIVE:   Events: no acute events overnight    Patient resting in bed. Wife next to him in the chair. Reports increased pain when ambulating in the halls. Has a constant discomfort and pressure to his abdomen. He is not passing much gas. Denies n/v. Denies f/c. KAVIN drain in place. Ostomy with good urine output. He is NPO except for ice chips.     OBJECTIVE:  PHYSICAL EXAM  Vital signs:  Temp: 97.9  F (36.6  C) Temp src: Oral BP: (!) 158/97 Pulse: 95   Resp: 17 SpO2: 97 % O2 Device: None (Room air) Oxygen Delivery: 1.5 LPM Height: 175.3 cm (5' 9\") Weight: 97.8 kg (215 lb 8 oz)  Estimated body mass index is 31.82 kg/m  as calculated from the following:    Height as of this encounter: 1.753 m (5' 9\").    Weight as of this encounter: 97.8 kg (215 lb 8 oz).    General: NAD, alert, cooperative  Neuro: A&O x 3. Moving all extremities equally.   Resp: reg resp rate/depth.   Abdomen: distended and tender abdomen. Staples covering incisions. Urostomy stoma is healthy red in color, ureterostomy tube tips x 2 noted extending from stoma. Urostomy draining yellow urine without sediment. KAVIN draining serosanguinous fluid.  Incisions: C/D/I, closed with staples, no significant ecchymosis noted. Telfa replaced over midline incision.   LE: CWMS, no bilateral LE edema.     LABS:  No results found for: \"INR\"   Lab Results   Component Value Date    WBC 11.8 10/24/2024     Lab Results   Component Value Date    RBC 3.78 10/24/2024     Lab Results   Component Value Date    HGB 11.1 10/24/2024     Lab Results   Component Value Date    HCT 35.5 10/24/2024     Lab Results   Component Value Date    MCV 94 10/24/2024     Lab Results   Component Value Date    MCH " 29.4 10/24/2024     Lab Results   Component Value Date    MCHC 31.3 10/24/2024     Lab Results   Component Value Date    RDW 17.3 10/24/2024     Lab Results   Component Value Date     10/24/2024     Creatinine   Date Value Ref Range Status   10/25/2024 1.21 (H) 0.67 - 1.17 mg/dL Final     Sodium   Date Value Ref Range Status   10/25/2024 138 135 - 145 mmol/L Final     Potassium   Date Value Ref Range Status   10/25/2024 4.3 3.4 - 5.3 mmol/L Final     Magnesium   Date Value Ref Range Status   10/23/2024 2.3 1.7 - 2.3 mg/dL Final     Lab Results: personally reviewed.       ASSESSMENT/PLAN:  POD #3 after Robotic-assisted laparoscopic radical cystoprostatectomy, Bilateral pelvic lymphadenectomy, Ileal conduit urinary diversion by Dr. Lynch for urothelial cell carcinoma      - Post-op ileus noted on XR 10/23. Encourage ambulation.   - May advance from NPO with sips/chip to clear liquid if tolerates.  Monitor for n/v. If worsens, would continue to be NPO again. Recommend stat abdominal x-ray if develops worsening distention, abdominal pain, or nausea and vomiting for consideration of nasogastric tube following imaging.    - continue bowel regimen. Senokot added.   - KAVIN drain with 995 ml in past 24 hours. Fluid creatinine 10/24 was not concerning for urine leak. Urostomy output adequate.  - Creatinine 1.21 today.  - Encourage incentive spirometer.   - Appreciate WOC RN assistance with urostomy management/teaching. Bag was changed today.     Discussed with:  Dr. Syed Kerr PA-C  [facility]   [phone]

## 2024-10-26 ENCOUNTER — APPOINTMENT (OUTPATIENT)
Dept: PHYSICAL THERAPY | Facility: HOSPITAL | Age: 69
DRG: 653 | End: 2024-10-26
Attending: STUDENT IN AN ORGANIZED HEALTH CARE EDUCATION/TRAINING PROGRAM
Payer: COMMERCIAL

## 2024-10-26 PROBLEM — K56.0 PARALYTIC ILEUS (H): Status: ACTIVE | Noted: 2024-10-26

## 2024-10-26 PROBLEM — C68.9 UROTHELIAL CARCINOMA (H): Status: ACTIVE | Noted: 2024-10-26

## 2024-10-26 LAB
ANION GAP SERPL CALCULATED.3IONS-SCNC: 15 MMOL/L (ref 7–15)
BUN SERPL-MCNC: 23.2 MG/DL (ref 8–23)
CALCIUM SERPL-MCNC: 9.1 MG/DL (ref 8.8–10.4)
CHLORIDE SERPL-SCNC: 103 MMOL/L (ref 98–107)
CREAT SERPL-MCNC: 1.38 MG/DL (ref 0.67–1.17)
EGFRCR SERPLBLD CKD-EPI 2021: 56 ML/MIN/1.73M2
ERYTHROCYTE [DISTWIDTH] IN BLOOD BY AUTOMATED COUNT: 16.5 % (ref 10–15)
GLUCOSE SERPL-MCNC: 146 MG/DL (ref 70–99)
HCO3 SERPL-SCNC: 20 MMOL/L (ref 22–29)
HCT VFR BLD AUTO: 37.4 % (ref 40–53)
HGB BLD-MCNC: 12.1 G/DL (ref 13.3–17.7)
MCH RBC QN AUTO: 29.7 PG (ref 26.5–33)
MCHC RBC AUTO-ENTMCNC: 32.4 G/DL (ref 31.5–36.5)
MCV RBC AUTO: 92 FL (ref 78–100)
PLATELET # BLD AUTO: 199 10E3/UL (ref 150–450)
POTASSIUM SERPL-SCNC: 3.7 MMOL/L (ref 3.4–5.3)
RBC # BLD AUTO: 4.07 10E6/UL (ref 4.4–5.9)
SODIUM SERPL-SCNC: 138 MMOL/L (ref 135–145)
WBC # BLD AUTO: 19.3 10E3/UL (ref 4–11)

## 2024-10-26 PROCEDURE — 36415 COLL VENOUS BLD VENIPUNCTURE: CPT | Performed by: STUDENT IN AN ORGANIZED HEALTH CARE EDUCATION/TRAINING PROGRAM

## 2024-10-26 PROCEDURE — 250N000013 HC RX MED GY IP 250 OP 250 PS 637: Performed by: HOSPITALIST

## 2024-10-26 PROCEDURE — 97116 GAIT TRAINING THERAPY: CPT | Mod: GP

## 2024-10-26 PROCEDURE — 250N000011 HC RX IP 250 OP 636: Performed by: STUDENT IN AN ORGANIZED HEALTH CARE EDUCATION/TRAINING PROGRAM

## 2024-10-26 PROCEDURE — 250N000013 HC RX MED GY IP 250 OP 250 PS 637: Performed by: STUDENT IN AN ORGANIZED HEALTH CARE EDUCATION/TRAINING PROGRAM

## 2024-10-26 PROCEDURE — 250N000013 HC RX MED GY IP 250 OP 250 PS 637: Performed by: INTERNAL MEDICINE

## 2024-10-26 PROCEDURE — 97110 THERAPEUTIC EXERCISES: CPT | Mod: GP

## 2024-10-26 PROCEDURE — 120N000001 HC R&B MED SURG/OB

## 2024-10-26 PROCEDURE — 85027 COMPLETE CBC AUTOMATED: CPT | Performed by: STUDENT IN AN ORGANIZED HEALTH CARE EDUCATION/TRAINING PROGRAM

## 2024-10-26 PROCEDURE — 80048 BASIC METABOLIC PNL TOTAL CA: CPT | Performed by: STUDENT IN AN ORGANIZED HEALTH CARE EDUCATION/TRAINING PROGRAM

## 2024-10-26 PROCEDURE — 99233 SBSQ HOSP IP/OBS HIGH 50: CPT | Performed by: HOSPITALIST

## 2024-10-26 RX ADMIN — HYDROMORPHONE HYDROCHLORIDE 3 MG: 2 TABLET ORAL at 13:32

## 2024-10-26 RX ADMIN — BISACODYL 10 MG: 10 SUPPOSITORY RECTAL at 20:04

## 2024-10-26 RX ADMIN — PIPERACILLIN AND TAZOBACTAM 3.38 G: 3; .375 INJECTION, POWDER, FOR SOLUTION INTRAVENOUS at 20:12

## 2024-10-26 RX ADMIN — BISACODYL 10 MG: 10 SUPPOSITORY RECTAL at 14:38

## 2024-10-26 RX ADMIN — HEPARIN SODIUM 5000 UNITS: 10000 INJECTION, SOLUTION INTRAVENOUS; SUBCUTANEOUS at 00:15

## 2024-10-26 RX ADMIN — HEPARIN SODIUM 5000 UNITS: 10000 INJECTION, SOLUTION INTRAVENOUS; SUBCUTANEOUS at 09:32

## 2024-10-26 RX ADMIN — PANTOPRAZOLE SODIUM 40 MG: 40 TABLET, DELAYED RELEASE ORAL at 05:58

## 2024-10-26 RX ADMIN — SENNOSIDES AND DOCUSATE SODIUM 1 TABLET: 8.6; 5 TABLET ORAL at 09:31

## 2024-10-26 RX ADMIN — HEPARIN SODIUM 5000 UNITS: 10000 INJECTION, SOLUTION INTRAVENOUS; SUBCUTANEOUS at 16:34

## 2024-10-26 RX ADMIN — HYDROMORPHONE HYDROCHLORIDE 3 MG: 2 TABLET ORAL at 09:31

## 2024-10-26 RX ADMIN — SENNOSIDES AND DOCUSATE SODIUM 1 TABLET: 8.6; 5 TABLET ORAL at 20:04

## 2024-10-26 RX ADMIN — PIPERACILLIN AND TAZOBACTAM 3.38 G: 3; .375 INJECTION, POWDER, FOR SOLUTION INTRAVENOUS at 13:19

## 2024-10-26 RX ADMIN — ROSUVASTATIN CALCIUM 40 MG: 40 TABLET, FILM COATED ORAL at 20:04

## 2024-10-26 RX ADMIN — HYDROMORPHONE HYDROCHLORIDE 0.4 MG: 0.2 INJECTION, SOLUTION INTRAMUSCULAR; INTRAVENOUS; SUBCUTANEOUS at 01:16

## 2024-10-26 RX ADMIN — HYDROMORPHONE HYDROCHLORIDE 3 MG: 2 TABLET ORAL at 22:18

## 2024-10-26 RX ADMIN — PIPERACILLIN AND TAZOBACTAM 3.38 G: 3; .375 INJECTION, POWDER, FOR SOLUTION INTRAVENOUS at 03:04

## 2024-10-26 RX ADMIN — NALOXEGOL OXALATE 12.5 MG: 12.5 TABLET, FILM COATED ORAL at 05:58

## 2024-10-26 NOTE — PROGRESS NOTES
"Urology Note  10/25/24    S:  Patient initially evaluated approximately 6 PM 10/25/2024.  He reports passing a small amount of flatus today.  No bowel movement.  Remains distended and has intermittent nausea.  He has had some clears.  Overall he feels slightly improved to stable.  He is ambulating.    O:  BP (!) 142/88 (BP Location: Left arm)   Pulse 113   Temp 99  F (37.2  C) (Oral)   Resp 18   Ht 1.753 m (5' 9\")   Wt 97.8 kg (215 lb 8 oz)   SpO2 95%   BMI 31.82 kg/m    On exam his abdomen remains distended and tender to palpation  Incisions are intact with staples, no drainage  KAVIN drain is draining scant amount of serosanguineous fluid  Urostomy in right lower quadrant is beefy red and healthy, bilateral ureteral stents in place, urine is dark alyssa    I/O (last 24 H/Last shift)  UOP 2575/1050  KAVIN drain 995/280    Labs  KAVIN creatinine on 10/24/2024 1.5 (consistent with serum)  CBC on 10/25/2024 with white blood cell count 24.5 (20.8 (increased from 11.8 yesterday)  BMP on 10/25/2024 with serum creatinine improved to 1.21    CT A/P with contrast - 10/25/24 -imaging personally reviewed interpreted and preliminary radiologist report reproduced below     FINDINGS:   LOWER CHEST: Mild dependent atelectasis in both lower lobes, greatest on the right. 3 mm noncalcified pulmonary nodule in the right middle lobe which has been stable since 06/07/2024 and 11/15/2021 CT, thus given the interval stability this pulmonary   nodule should be benign with no followup recommended. Minimal dependent atelectasis in both lower lobes.     HEPATOBILIARY: Gallbladder is slightly prominent in size, but otherwise normal in appearance. The liver, bile ducts, hepatic veins, and portal veins are normal.     PANCREAS: Normal.     SPLEEN: Normal.     ADRENAL GLANDS: Normal.     KIDNEYS/BLADDER: There is mild to moderate ureteral pyelocaliectasis seen involving both ureters extending into the conduit. There are ureteral stents seen which " appear to be in good position. The above findings may represent ureteral stent malfunction.   There is abnormal increased urothelial enhancement seen involving both urinary tracts, greatest on the right, which can be seen with changes of inflammation. There are abnormal subtle low-density changes seen involving the superior pole of the right   kidney laterally, which can be associated with changes of pyelonephritis, but no evidence for an abscess however. There are benign cysts seen scattered in both kidneys with no followup recommended. The bladder is surgically absent.     BOWEL: There are some mildly prominent air-fluid levels seen scattered in the colon with no evidence for obstipation or obstruction.     LYMPH NODES: Normal.     VASCULATURE: Moderate calcification of the abdominal aorta. No evidence for high-grade stenosis or aneurysmal dilatation identified.     PELVIC ORGANS: Surgically absent.     MUSCULOSKELETAL: There is some gas seen in the region of the inguinal canals bilaterally extending into the subcutaneous fat of the lower right abdomen and hemipelvis; this is presumed to be iatrogenic in nature. Acute postoperative changes of a   laparotomy. Moderate to advanced scattered hypertrophic changes of the spine.                                                                      IMPRESSION:   1.  Findings worrisome for inflammation involving both ureters and changes of pyelonephritis in the upper pole of the right kidney.     2.  Prominent bilateral urinary tract collecting systems worrisome for changes of malfunction of the urinary tract stents.     3.  Acute postoperative changes.    A/P:  69-year-old male with history of muscle invasive bladder cancer who is postoperative day #3 from robotic assisted radical cystoprostatectomy and extracorporeal ileal conduit urinary diversion.  He has new prominent leukocytosis with white blood cell count up to 24 and new onset tachycardia this evening with heart  rate up to 113.  His clinical exam is relatively stable and most consistent with ileus there was abdominal tenderness (no peritoneal signs) raises the concern for intra-abdominal process.  In light of the new leukocytosis as well as tachycardia and his ongoing and abdominal pain I ordered a STAT CT abdomen/pelvis contrast with results as noted above.     There is no evidence of bowel leak or bowel obstruction but he does have prominent air within the colon.  There is also findings suggestive of possible urinary tract infection particularly on the right side.  Of note patient has history of chronic right ureteral obstruction related from his bladder tumor which required longstanding indwelling ureteral stent.  His course was further complicated by multiple urinary tract infections leading up to the surgery.  Urine cultures from these infections showed pansensitive E. coli.  The mild bilateral hydroureteronephrosis on today's CT appears consistent with what I would expect postoperatively.  The stents appear to be in good position and functioning.  Will plan to workup for urinary tract infection and start empiric antibiotic therapy.  Will also try to decompress the colon from below.    -Continue clear liquid diet, patient to self regulate intake (avoid intake if nauseous)  -Please administer rectal suppository now  -Send urine culture now  -Will start empiric Zosyn once urine for culture been collected  -Will plan to flush ureteral stents to ensure/improve patency      Dharmesh Lynch MD  [facility]  (p) [phone]

## 2024-10-26 NOTE — PROGRESS NOTES
[facility]   POST-OPERATIVE Progress Note    Place of Service: [facility]    Surgery: POD # 4 status post radical cystectomy with ileal conduit    Subjective: Patient reports that he feels slightly better than yesterday.  He passed a large amount of flatus with the suppository.  Some flatus since then.  No bowel movement.       Objective  Intake/Output last 24 hrs:    Intake/Output Summary (Last 24 hours) at 10/26/2024 1015  Last data filed at 10/26/2024 0549  Gross per 24 hour   Intake 390 ml   Output 2650 ml   Net -2260 ml       Physical Exam:  Temp:  [97.7  F (36.5  C)-99  F (37.2  C)] 98.3  F (36.8  C)  Pulse:  [] 89  Resp:  [18] 18  BP: (142-178)/(88-97) 146/89  SpO2:  [93 %-96 %] 96 %  General: NAD, alert, cooperative  Abdomen: Soft, moderate distention, tympanitic, appropriate postoperative tenderness.         Drains: KAVIN with 655 cc in past 24 hrs, serosanguineous         Incisions: Clean, dry, intact  Genitourinary: Clear urine per conduit.    Pertinent Labs   Lab Results: personally reviewed.     Creatinine close to baseline.  White blood cell count down from 24.5-19.3.      Assessment/Plan  Lobito Zheng is POD # 4 status post radical cystectomy.  Leukocytosis improved.  Possible pyelonephritis.  Still requires close observation.  Will continue antibiotics.  Ileus persists.  We discussed NG tube placement should he develop nausea and vomiting.    Benson Navas MD  [facility]  Phone #[phone]

## 2024-10-26 NOTE — PROGRESS NOTES
"[facility]    Medicine Progress Note - Hospitalist Service    Date of Admission:  10/22/2024    Assessment & Plan   69-year-old male with bladder cancer admitted for surgery.  Postop course complicated by ileus.    #Muscle invasive bladder cancer  S/p robotic assisted laparoscopic cystoprostatectomy with bilateral pelvic lymph node dissection and extracorporeal ileal conduit urinary diversion 10/22  Urology primary  Pain control    #Possible R pyelonephritis  Evident on CT 10/25pm  UCx pending  Empiric Zosyn    #Postop ileus  BM last night  Has only had sips of water thus far, advance to clear liquid diet  Suppository HS    #VAMSHI on CKD stage II  #Anion gap metabolic acidosis  S/p IV fluids  Monitor    #Elevated blood pressure  Likely due to pain, no prior history of hypertension  As needed IV hydralazine for now  Monitor trend, particularly when pain is controlled and consider adding oral antihypertensive if needed    #History of diabetes type 2  A1c 5.3  Holding metformin  Stopped glucose checks     #KRYS  CPAP      VTE ppx: sc Heparin          Diet: Clear Liquid Diet    Lombardo Catheter: Not present  Lines: None     Cardiac Monitoring: None  Code Status: Full Code      Clinically Significant Risk Factors                             # Obesity: Estimated body mass index is 31.82 kg/m  as calculated from the following:    Height as of this encounter: 1.753 m (5' 9\").    Weight as of this encounter: 97.8 kg (215 lb 8 oz).      # Financial/Environmental Concerns: none         Disposition Plan     Medically Ready for Discharge: Anticipated in 2-4 Days             Carter Mendoza DO  Hospitalist Service  [facility]  Securely message with [employer] (more info)  Text page via [employer] Paging/Directory   ______________________________________________________________________    Interval History   Feels a lot better today after BM last night.     Physical Exam   Vital Signs: Temp: 98.3 "  F (36.8  C) Temp src: Oral BP: (!) 146/89 Pulse: 89   Resp: 18 SpO2: 96 % O2 Device: None (Room air)    Weight: 215 lbs 8 oz  General Appearance:  No acute distress  Respiratory: Clear to auscultation bilaterally  Cardiovascular: Regular rate and rhythm  GI: Normal bowel sounds, abdomen is soft, mildly distended with much improvement from yesterday, no rebound      Medical Decision Making       50 MINUTES SPENT BY ME on the date of service doing chart review, history, exam, documentation & further activities per the note.      Data

## 2024-10-26 NOTE — PLAN OF CARE
Problem: Pain Acute  Goal: Optimal Pain Control and Function  Outcome: Progressing  Intervention: Prevent or Manage Pain  Recent Flowsheet Documentation  Taken 10/26/2024 0014 by Fela Canela RN  Medication Review/Management: medications reviewed   Goal Outcome Evaluation:       Pt is alert and oriented, pain controlled with dilaudid x1 (see MAR), urostomy patent and draining with 700ml output, KAVIN drains 180ml output.Slept on and off without his CPAP.  Vital signs:  Temp: 98.3  F (36.8  C) Temp src: Oral BP: (!) 146/89 Pulse: 89   Resp: 18 SpO2: 96 % O2 Device: None (Room air)                    This was a shared visit with the KARTHIK. I reviewed and verified the documentation and independently performed the documented:

## 2024-10-26 NOTE — PLAN OF CARE
Had a small bm this morning and another one last night. Up walking halls this morning. Pain controlled with Dilaudid 3mg tabs. Tolerating clears no nausea. Walked baker this afternoon.

## 2024-10-26 NOTE — PLAN OF CARE
Problem: Adult Inpatient Plan of Care  Goal: Absence of Hospital-Acquired Illness or Injury  Intervention: Prevent and Manage VTE (Venous Thromboembolism) Risk  Recent Flowsheet Documentation  Taken 10/25/2024 1547 by Maria C Chávez RN  VTE Prevention/Management: SCDs on (sequential compression devices)     Problem: Adult Inpatient Plan of Care  Goal: Absence of Hospital-Acquired Illness or Injury  Intervention: Identify and Manage Fall Risk  Recent Flowsheet Documentation  Taken 10/25/2024 1547 by Maria C Chávez RN  Safety Promotion/Fall Prevention:   activity supervised   room door open   nonskid shoes/slippers when out of bed   assistive device/personal items within reach   mobility aid in reach   lighting adjusted   increase visualization of patient     Problem: Surgery Nonspecified  Goal: Effective Urinary Elimination  Outcome: Progressing   Urostomy intact and draining well.   Urine culture specimen sent.  Problem: Pain Acute  Goal: Optimal Pain Control and Function  Outcome: Progressing  Intervention: Prevent or Manage Pain  Recent Flowsheet Documentation  Taken 10/25/2024 1547 by Maria C Chávez RN  Medication Review/Management: medications reviewed   PRN dilaudid given which was effective.

## 2024-10-27 ENCOUNTER — APPOINTMENT (OUTPATIENT)
Dept: PHYSICAL THERAPY | Facility: HOSPITAL | Age: 69
DRG: 653 | End: 2024-10-27
Attending: STUDENT IN AN ORGANIZED HEALTH CARE EDUCATION/TRAINING PROGRAM
Payer: COMMERCIAL

## 2024-10-27 LAB
ANION GAP SERPL CALCULATED.3IONS-SCNC: 13 MMOL/L (ref 7–15)
BACTERIA UR CULT: ABNORMAL
BASOPHILS # BLD AUTO: 0.1 10E3/UL (ref 0–0.2)
BASOPHILS NFR BLD AUTO: 0 %
BUN SERPL-MCNC: 28.8 MG/DL (ref 8–23)
CALCIUM SERPL-MCNC: 9 MG/DL (ref 8.8–10.4)
CHLORIDE SERPL-SCNC: 101 MMOL/L (ref 98–107)
CREAT SERPL-MCNC: 1.57 MG/DL (ref 0.67–1.17)
DACRYOCYTES BLD QL SMEAR: SLIGHT
EGFRCR SERPLBLD CKD-EPI 2021: 48 ML/MIN/1.73M2
ELLIPTOCYTES BLD QL SMEAR: SLIGHT
EOSINOPHIL # BLD AUTO: 0.3 10E3/UL (ref 0–0.7)
EOSINOPHIL NFR BLD AUTO: 2 %
ERYTHROCYTE [DISTWIDTH] IN BLOOD BY AUTOMATED COUNT: 16.2 % (ref 10–15)
GLUCOSE SERPL-MCNC: 102 MG/DL (ref 70–99)
HCO3 SERPL-SCNC: 21 MMOL/L (ref 22–29)
HCT VFR BLD AUTO: 39.4 % (ref 40–53)
HGB BLD-MCNC: 12.7 G/DL (ref 13.3–17.7)
IMM GRANULOCYTES # BLD: 0.1 10E3/UL
IMM GRANULOCYTES NFR BLD: 1 %
LYMPHOCYTES # BLD AUTO: 1.8 10E3/UL (ref 0.8–5.3)
LYMPHOCYTES NFR BLD AUTO: 14 %
MCH RBC QN AUTO: 29.7 PG (ref 26.5–33)
MCHC RBC AUTO-ENTMCNC: 32.2 G/DL (ref 31.5–36.5)
MCV RBC AUTO: 92 FL (ref 78–100)
MONOCYTES # BLD AUTO: 1.7 10E3/UL (ref 0–1.3)
MONOCYTES NFR BLD AUTO: 13 %
NEUTROPHILS # BLD AUTO: 9.3 10E3/UL (ref 1.6–8.3)
NEUTROPHILS NFR BLD AUTO: 71 %
NRBC # BLD AUTO: 0 10E3/UL
NRBC BLD AUTO-RTO: 0 /100
PLAT MORPH BLD: ABNORMAL
PLATELET # BLD AUTO: 190 10E3/UL (ref 150–450)
POTASSIUM SERPL-SCNC: 3.9 MMOL/L (ref 3.4–5.3)
RBC # BLD AUTO: 4.28 10E6/UL (ref 4.4–5.9)
RBC MORPH BLD: ABNORMAL
SODIUM SERPL-SCNC: 135 MMOL/L (ref 135–145)
WBC # BLD AUTO: 13.2 10E3/UL (ref 4–11)

## 2024-10-27 PROCEDURE — 99232 SBSQ HOSP IP/OBS MODERATE 35: CPT | Performed by: HOSPITALIST

## 2024-10-27 PROCEDURE — 97110 THERAPEUTIC EXERCISES: CPT | Mod: GP

## 2024-10-27 PROCEDURE — 250N000013 HC RX MED GY IP 250 OP 250 PS 637: Performed by: STUDENT IN AN ORGANIZED HEALTH CARE EDUCATION/TRAINING PROGRAM

## 2024-10-27 PROCEDURE — 36415 COLL VENOUS BLD VENIPUNCTURE: CPT | Performed by: HOSPITALIST

## 2024-10-27 PROCEDURE — 80048 BASIC METABOLIC PNL TOTAL CA: CPT | Performed by: STUDENT IN AN ORGANIZED HEALTH CARE EDUCATION/TRAINING PROGRAM

## 2024-10-27 PROCEDURE — 250N000011 HC RX IP 250 OP 636: Performed by: HOSPITALIST

## 2024-10-27 PROCEDURE — 97116 GAIT TRAINING THERAPY: CPT | Mod: GP

## 2024-10-27 PROCEDURE — 250N000011 HC RX IP 250 OP 636: Performed by: STUDENT IN AN ORGANIZED HEALTH CARE EDUCATION/TRAINING PROGRAM

## 2024-10-27 PROCEDURE — 250N000013 HC RX MED GY IP 250 OP 250 PS 637: Performed by: HOSPITALIST

## 2024-10-27 PROCEDURE — 85004 AUTOMATED DIFF WBC COUNT: CPT | Performed by: HOSPITALIST

## 2024-10-27 PROCEDURE — 250N000013 HC RX MED GY IP 250 OP 250 PS 637: Performed by: INTERNAL MEDICINE

## 2024-10-27 PROCEDURE — 120N000001 HC R&B MED SURG/OB

## 2024-10-27 RX ORDER — CIPROFLOXACIN 2 MG/ML
400 INJECTION, SOLUTION INTRAVENOUS EVERY 12 HOURS
Status: DISCONTINUED | OUTPATIENT
Start: 2024-10-27 | End: 2024-10-30

## 2024-10-27 RX ADMIN — SENNOSIDES AND DOCUSATE SODIUM 1 TABLET: 8.6; 5 TABLET ORAL at 08:04

## 2024-10-27 RX ADMIN — BISACODYL 10 MG: 10 SUPPOSITORY RECTAL at 21:16

## 2024-10-27 RX ADMIN — HYDROMORPHONE HYDROCHLORIDE 3 MG: 2 TABLET ORAL at 21:15

## 2024-10-27 RX ADMIN — ROSUVASTATIN CALCIUM 40 MG: 40 TABLET, FILM COATED ORAL at 21:15

## 2024-10-27 RX ADMIN — NALOXEGOL OXALATE 12.5 MG: 12.5 TABLET, FILM COATED ORAL at 05:54

## 2024-10-27 RX ADMIN — HYDROMORPHONE HYDROCHLORIDE 2 MG: 2 TABLET ORAL at 00:48

## 2024-10-27 RX ADMIN — HEPARIN SODIUM 5000 UNITS: 10000 INJECTION, SOLUTION INTRAVENOUS; SUBCUTANEOUS at 15:35

## 2024-10-27 RX ADMIN — PANTOPRAZOLE SODIUM 40 MG: 40 TABLET, DELAYED RELEASE ORAL at 05:54

## 2024-10-27 RX ADMIN — HEPARIN SODIUM 5000 UNITS: 10000 INJECTION, SOLUTION INTRAVENOUS; SUBCUTANEOUS at 00:44

## 2024-10-27 RX ADMIN — HYDROMORPHONE HYDROCHLORIDE 3 MG: 2 TABLET ORAL at 11:01

## 2024-10-27 RX ADMIN — CIPROFLOXACIN 400 MG: 2 INJECTION, SOLUTION INTRAVENOUS at 10:53

## 2024-10-27 RX ADMIN — SENNOSIDES AND DOCUSATE SODIUM 1 TABLET: 8.6; 5 TABLET ORAL at 21:15

## 2024-10-27 RX ADMIN — CIPROFLOXACIN 400 MG: 2 INJECTION, SOLUTION INTRAVENOUS at 21:45

## 2024-10-27 RX ADMIN — HYDROMORPHONE HYDROCHLORIDE 3 MG: 2 TABLET ORAL at 08:04

## 2024-10-27 RX ADMIN — PIPERACILLIN AND TAZOBACTAM 3.38 G: 3; .375 INJECTION, POWDER, FOR SOLUTION INTRAVENOUS at 02:52

## 2024-10-27 RX ADMIN — HYDROMORPHONE HYDROCHLORIDE 3 MG: 2 TABLET ORAL at 15:34

## 2024-10-27 RX ADMIN — HEPARIN SODIUM 5000 UNITS: 10000 INJECTION, SOLUTION INTRAVENOUS; SUBCUTANEOUS at 08:04

## 2024-10-27 NOTE — PLAN OF CARE
Feeling better today. Passing gas. Pain controlled. Bm overnight. Second dose of dilaudid given and effective. Tolerating full liquids.

## 2024-10-27 NOTE — PLAN OF CARE
Problem: Adult Inpatient Plan of Care  Goal: Absence of Hospital-Acquired Illness or Injury  Intervention: Prevent and Manage VTE (Venous Thromboembolism) Risk  Recent Flowsheet Documentation  Taken 10/27/2024 0048 by Fela Canela RN  VTE Prevention/Management: (pt walks) other (see comments)     Problem: Adult Inpatient Plan of Care  Goal: Optimal Comfort and Wellbeing  Intervention: Monitor Pain and Promote Comfort  Recent Flowsheet Documentation  Taken 10/27/2024 0048 by Fela Canela RN  Pain Management Interventions: medication (see MAR)   Goal Outcome Evaluation:    Problem: Activity Intolerance  Goal: Enhanced Capacity and Energy  Outcome: Progressing  Intervention: Optimize Activity Tolerance  Recent Flowsheet Documentation  Taken 10/27/2024 0048 by Fela Canela RN  Activity Management: activity adjusted per tolerance        Patient pain controlled with prn dilaudid  x1 (see MAR), ambulated 650 feet outside the room. Had small soft BM per pt report. Urostomy and KAVIN drains are intact and draining. VSS on RA  Vital signs:  Temp: 97.7  F (36.5  C) Temp src: Oral BP: 128/67 Pulse: 92   Resp: 18 SpO2: 94 % O2 Device: None (Room air) and stable

## 2024-10-27 NOTE — PROGRESS NOTES
"[facility]    Medicine Progress Note - Hospitalist Service    Date of Admission:  10/22/2024    Assessment & Plan   69-year-old male with bladder cancer admitted for surgery.  Postop course complicated by ileus.    #Muscle invasive bladder cancer  S/p robotic assisted laparoscopic cystoprostatectomy with bilateral pelvic lymph node dissection and extracorporeal ileal conduit urinary diversion 10/22  Urology primary  Pain control    #Possible R pyelonephritis  Evident on CT 10/25pm  UCx grew pansensitive Pseudomonas aeruginosa  Zosyn switched to Cipro    #Postop ileus  Slowly improving  Having flatus and small BMs  Suppository HS  CLD for now, advance per surgery rec    #VAMSHI on CKD stage II  #Anion gap metabolic acidosis, improving  S/p IV fluids  Received contrast 10/25  Avoid nephrotoxic agents and hypotension  Monitor    #Elevated blood pressure, improved  Likely due to pain, no prior history of hypertension  As needed IV hydralazine for now  Monitor trend, particularly when pain is controlled and consider adding oral antihypertensive if needed    #History of diabetes type 2  A1c 5.3  Holding metformin  Stopped glucose checks     #KRYS  CPAP      VTE ppx: sc Heparin          Diet: Clear Liquid Diet    Lombardo Catheter: Not present  Lines: None     Cardiac Monitoring: None  Code Status: Full Code      Clinically Significant Risk Factors                             # Obesity: Estimated body mass index is 31.38 kg/m  as calculated from the following:    Height as of this encounter: 1.753 m (5' 9\").    Weight as of this encounter: 96.4 kg (212 lb 8 oz).      # Financial/Environmental Concerns: none         Disposition Plan     Medically Ready for Discharge: Anticipated in 2-4 Days             Carter Mendoza DO  Hospitalist Service  [facility]  Securely message with [employer] (more info)  Text page via [employer] Paging/Directory "   ______________________________________________________________________    Interval History   Reports small BM last night and flatus this morning.  Tolerating clear liquids.    Physical Exam   Vital Signs: Temp: 97.6  F (36.4  C) Temp src: Oral BP: 125/83 Pulse: 91   Resp: 18 SpO2: 97 % O2 Device: None (Room air)    Weight: 212 lbs 8 oz    General Appearance:  No acute distress  Respiratory: Clear to auscultation bilaterally  Cardiovascular: Regular rate and rhythm  GI: Hypoactive bowel sounds, abdomen is soft, mildly distended with no rebound    Medical Decision Making             Data

## 2024-10-27 NOTE — PROGRESS NOTES
[facility]   POST-OPERATIVE Progress Note    Place of Service: [facility]    Surgery: POD # 5 status post radical cystectomy with ileal conduit    Subjective: Patient reports that he feels significantly better than yesterday.  He was passing flatus and has had a small bowel movement.  He is ambulating better.      Objective  Intake/Output last 24 hrs:    Intake/Output Summary (Last 24 hours) at 10/26/2024 1015  Last data filed at 10/26/2024 0549  Gross per 24 hour   Intake 390 ml   Output 2650 ml   Net -2260 ml       Physical Exam:  Temp:  [97.6  F (36.4  C)-98  F (36.7  C)] 97.6  F (36.4  C)  Pulse:  [90-92] 91  Resp:  [18] 18  BP: (125-141)/(67-91) 125/83  SpO2:  [94 %-97 %] 97 %  General: NAD, alert, cooperative  Abdomen: Soft, moderate distention, tympanitic, appropriate postoperative tenderness.         Drains: KAVIN with 660 cc in past 24 hrs, serosanguineous         Incisions: Clean, dry, intact  Genitourinary: Clear urine per conduit.    Pertinent Labs   Lab Results: personally reviewed.     Creatinine is up slightly, CBC shows a significantly improved WBC.  Assessment/Plan  Lobito Zheng is POD # 4 status post radical cystectomy.  Leukocytosis improved.  Possible pyelonephritis.  Still requires close observation.  Will continue antibiotics.  Ileus seems to be improving.  Full liquid diet.    Benson Navas MD  [facility]  Phone #[phone]

## 2024-10-27 NOTE — PLAN OF CARE
Problem: Adult Inpatient Plan of Care  Goal: Optimal Comfort and Wellbeing  Outcome: Progressing     Problem: Surgery Nonspecified  Goal: Effective Oxygenation and Ventilation  Outcome: Progressing  Intervention: Optimize Oxygenation and Ventilation  Recent Flowsheet Documentation  Taken 10/26/2024 1615 by Maria C Chávez RN  Cough And Deep Breathing: done independently per patient   Spo2 95% on RA. On continuous pulse oximetry.  Problem: Surgery Nonspecified  Goal: Nausea and Vomiting Relief  Outcome: Progressing   Tolerated clear liquid.  Problem: Surgery Nonspecified  Goal: Effective Urinary Elimination  Outcome: Progressing   Urostomy intact and draining well.  Problem: Activity Intolerance  Goal: Enhanced Capacity and Energy  Outcome: Progressing  Walked in the hallways.

## 2024-10-28 ENCOUNTER — APPOINTMENT (OUTPATIENT)
Dept: OCCUPATIONAL THERAPY | Facility: HOSPITAL | Age: 69
DRG: 653 | End: 2024-10-28
Attending: STUDENT IN AN ORGANIZED HEALTH CARE EDUCATION/TRAINING PROGRAM
Payer: COMMERCIAL

## 2024-10-28 ENCOUNTER — APPOINTMENT (OUTPATIENT)
Dept: PHYSICAL THERAPY | Facility: HOSPITAL | Age: 69
DRG: 653 | End: 2024-10-28
Attending: STUDENT IN AN ORGANIZED HEALTH CARE EDUCATION/TRAINING PROGRAM
Payer: COMMERCIAL

## 2024-10-28 LAB
ANION GAP SERPL CALCULATED.3IONS-SCNC: 15 MMOL/L (ref 7–15)
BUN SERPL-MCNC: 29.5 MG/DL (ref 8–23)
CALCIUM SERPL-MCNC: 9.3 MG/DL (ref 8.8–10.4)
CHLORIDE SERPL-SCNC: 98 MMOL/L (ref 98–107)
CREAT SERPL-MCNC: 1.68 MG/DL (ref 0.67–1.17)
EGFRCR SERPLBLD CKD-EPI 2021: 44 ML/MIN/1.73M2
ERYTHROCYTE [DISTWIDTH] IN BLOOD BY AUTOMATED COUNT: 15.8 % (ref 10–15)
GLUCOSE SERPL-MCNC: 109 MG/DL (ref 70–99)
HCO3 SERPL-SCNC: 22 MMOL/L (ref 22–29)
HCT VFR BLD AUTO: 41.1 % (ref 40–53)
HGB BLD-MCNC: 12.9 G/DL (ref 13.3–17.7)
MCH RBC QN AUTO: 28.8 PG (ref 26.5–33)
MCHC RBC AUTO-ENTMCNC: 31.4 G/DL (ref 31.5–36.5)
MCV RBC AUTO: 92 FL (ref 78–100)
PATH REPORT.COMMENTS IMP SPEC: ABNORMAL
PATH REPORT.COMMENTS IMP SPEC: ABNORMAL
PATH REPORT.COMMENTS IMP SPEC: YES
PATH REPORT.FINAL DX SPEC: ABNORMAL
PATH REPORT.GROSS SPEC: ABNORMAL
PATH REPORT.INTRAOP OBS SPEC DOC: ABNORMAL
PATH REPORT.MICROSCOPIC SPEC OTHER STN: ABNORMAL
PATH REPORT.RELEVANT HX SPEC: ABNORMAL
PATHOLOGY SYNOPTIC REPORT: ABNORMAL
PHOTO IMAGE: ABNORMAL
PLATELET # BLD AUTO: 304 10E3/UL (ref 150–450)
POTASSIUM SERPL-SCNC: 3.9 MMOL/L (ref 3.4–5.3)
RBC # BLD AUTO: 4.48 10E6/UL (ref 4.4–5.9)
SODIUM SERPL-SCNC: 135 MMOL/L (ref 135–145)
WBC # BLD AUTO: 13.3 10E3/UL (ref 4–11)

## 2024-10-28 PROCEDURE — G0463 HOSPITAL OUTPT CLINIC VISIT: HCPCS

## 2024-10-28 PROCEDURE — 250N000013 HC RX MED GY IP 250 OP 250 PS 637: Performed by: INTERNAL MEDICINE

## 2024-10-28 PROCEDURE — 120N000001 HC R&B MED SURG/OB

## 2024-10-28 PROCEDURE — 250N000013 HC RX MED GY IP 250 OP 250 PS 637: Performed by: STUDENT IN AN ORGANIZED HEALTH CARE EDUCATION/TRAINING PROGRAM

## 2024-10-28 PROCEDURE — 88331 PATH CONSLTJ SURG 1 BLK 1SPC: CPT | Mod: 26 | Performed by: PATHOLOGY

## 2024-10-28 PROCEDURE — 36415 COLL VENOUS BLD VENIPUNCTURE: CPT | Performed by: STUDENT IN AN ORGANIZED HEALTH CARE EDUCATION/TRAINING PROGRAM

## 2024-10-28 PROCEDURE — 250N000013 HC RX MED GY IP 250 OP 250 PS 637: Performed by: HOSPITALIST

## 2024-10-28 PROCEDURE — 97110 THERAPEUTIC EXERCISES: CPT | Mod: GP

## 2024-10-28 PROCEDURE — 88307 TISSUE EXAM BY PATHOLOGIST: CPT | Mod: 26 | Performed by: PATHOLOGY

## 2024-10-28 PROCEDURE — 85018 HEMOGLOBIN: CPT | Performed by: UROLOGY

## 2024-10-28 PROCEDURE — 99233 SBSQ HOSP IP/OBS HIGH 50: CPT | Performed by: HOSPITALIST

## 2024-10-28 PROCEDURE — 80048 BASIC METABOLIC PNL TOTAL CA: CPT | Performed by: STUDENT IN AN ORGANIZED HEALTH CARE EDUCATION/TRAINING PROGRAM

## 2024-10-28 PROCEDURE — 88305 TISSUE EXAM BY PATHOLOGIST: CPT | Mod: 26 | Performed by: PATHOLOGY

## 2024-10-28 PROCEDURE — 250N000011 HC RX IP 250 OP 636: Performed by: STUDENT IN AN ORGANIZED HEALTH CARE EDUCATION/TRAINING PROGRAM

## 2024-10-28 PROCEDURE — 250N000011 HC RX IP 250 OP 636: Performed by: HOSPITALIST

## 2024-10-28 PROCEDURE — 88309 TISSUE EXAM BY PATHOLOGIST: CPT | Mod: 26 | Performed by: PATHOLOGY

## 2024-10-28 PROCEDURE — 97116 GAIT TRAINING THERAPY: CPT | Mod: GP

## 2024-10-28 PROCEDURE — 97535 SELF CARE MNGMENT TRAINING: CPT | Mod: GO

## 2024-10-28 RX ORDER — HEPARIN SODIUM,PORCINE 10 UNIT/ML
5-10 VIAL (ML) INTRAVENOUS EVERY 24 HOURS
Status: DISCONTINUED | OUTPATIENT
Start: 2024-10-28 | End: 2024-10-31 | Stop reason: HOSPADM

## 2024-10-28 RX ORDER — HEPARIN SODIUM (PORCINE) LOCK FLUSH IV SOLN 100 UNIT/ML 100 UNIT/ML
5-10 SOLUTION INTRAVENOUS
Status: DISCONTINUED | OUTPATIENT
Start: 2024-10-28 | End: 2024-10-31 | Stop reason: HOSPADM

## 2024-10-28 RX ORDER — HEPARIN SODIUM,PORCINE 10 UNIT/ML
5-10 VIAL (ML) INTRAVENOUS
Status: DISCONTINUED | OUTPATIENT
Start: 2024-10-28 | End: 2024-10-31 | Stop reason: HOSPADM

## 2024-10-28 RX ADMIN — HYDROMORPHONE HYDROCHLORIDE 3 MG: 2 TABLET ORAL at 17:12

## 2024-10-28 RX ADMIN — PANTOPRAZOLE SODIUM 40 MG: 40 TABLET, DELAYED RELEASE ORAL at 05:48

## 2024-10-28 RX ADMIN — CIPROFLOXACIN 400 MG: 2 INJECTION, SOLUTION INTRAVENOUS at 22:35

## 2024-10-28 RX ADMIN — HYDROMORPHONE HYDROCHLORIDE 3 MG: 2 TABLET ORAL at 12:11

## 2024-10-28 RX ADMIN — HYDROMORPHONE HYDROCHLORIDE 3 MG: 2 TABLET ORAL at 09:16

## 2024-10-28 RX ADMIN — BISACODYL 10 MG: 10 SUPPOSITORY RECTAL at 20:43

## 2024-10-28 RX ADMIN — ROSUVASTATIN CALCIUM 40 MG: 40 TABLET, FILM COATED ORAL at 20:43

## 2024-10-28 RX ADMIN — SENNOSIDES AND DOCUSATE SODIUM 1 TABLET: 8.6; 5 TABLET ORAL at 09:16

## 2024-10-28 RX ADMIN — HEPARIN SODIUM 5000 UNITS: 10000 INJECTION, SOLUTION INTRAVENOUS; SUBCUTANEOUS at 09:16

## 2024-10-28 RX ADMIN — HEPARIN SODIUM 5000 UNITS: 10000 INJECTION, SOLUTION INTRAVENOUS; SUBCUTANEOUS at 17:08

## 2024-10-28 RX ADMIN — CIPROFLOXACIN 400 MG: 2 INJECTION, SOLUTION INTRAVENOUS at 12:01

## 2024-10-28 RX ADMIN — HYDROMORPHONE HYDROCHLORIDE 3 MG: 2 TABLET ORAL at 20:54

## 2024-10-28 RX ADMIN — SENNOSIDES AND DOCUSATE SODIUM 1 TABLET: 8.6; 5 TABLET ORAL at 20:43

## 2024-10-28 RX ADMIN — HYDROMORPHONE HYDROCHLORIDE 3 MG: 2 TABLET ORAL at 00:26

## 2024-10-28 RX ADMIN — NALOXEGOL OXALATE 12.5 MG: 12.5 TABLET, FILM COATED ORAL at 05:48

## 2024-10-28 RX ADMIN — HEPARIN SODIUM 5000 UNITS: 10000 INJECTION, SOLUTION INTRAVENOUS; SUBCUTANEOUS at 00:05

## 2024-10-28 NOTE — PROGRESS NOTES
[facility]  WO Nurse Inpatient Assessment     Consulted for: urostomy, ileal conduit    Summary: 10/25 [facility], advised spouse to begin process and see what [facility] requires for DME ordering.    10/24 - rounded on patient earlier in the day; patient had a lot of visitors so we set a time for the afternoon. At 1400 woc rounded on patient, he was sitting in the chair, wife at bedside. Patient was having severe pain and cramping and declined a pouch change today. Will reassess and attempt another hands on session tomorrow if able. Below assessment kept for continuity.       Patient History (according to provider note(s):      Lobito Zheng is a 69 year old male with a past medical history documented below presented to the hospital for a robotic assisted laparoscopic cystoprostatectomy with bilateral pelvic lymph node dissection and extracorporeal ileal conduit urinary diversion and is s/p procedure     Assessment:      Areas visualized during today's visit: Abdomen    Assessment of new end Urostomy:  Diagnosis Pertinent to Stoma: Cancer - Bladder      Surgery Date: 10/22/2024  [facility]: [facility]  Pouching system in place on assessment today: Carmen two piece and cut to fit   Pouch barrier status: Edges lifting   Pouch last changed/wear time: 4 days  Reason for pouch change today: ostomy education and routine schedule  Effectiveness of current pouching/ supply plan: Needs soft convexity at discharge  possible   Change made with ostomy management today: No  Pouching system placed today: [place] two piece, cut to fit, and flat (abdomen very distended, once softened may need to adjust)  Supplies: gathered          Last photo: 10/28  Stoma location: RLQ  Stoma size: 1 1/8 high x 1.25 wide inches, Ureteral stents  Stoma appearance: beefy red, oval, moist, and edematous  Mucocutaneous junction:  intact  Peristomal  complication(s): none   Output: alyssa  Output volume emptied during visit: 0 ml  Abdominal assessment: Distended and Firm  Surgical site(s): staples intact  NG still in place? No  Pain: Sharp and Stabbing  Is patient still on a PCA? No    Ostomy education assessment:  Participant of teaching session today: patient  and spouse  Education completed today: Stoma assessment, Pouching system assessment , Pouch change return demonstration, Peristomal skin care, Pouch emptying return demonstration, and Lifestyle adjustments   Educational materials/methods: Verbal, Written, Demonstration, and Addressed patient/caregiver questions/concerns  Education still needed: complete  Learning Comprehension:   Psychosocial assessment: ready but in a lot of pain  Patient readiness for education today: observing and attentive  Following today's visit: patient  and spouse is able to demonstrate;         1. How to empty their pouch? Yes        2. How to change their pouch? Yes, with minimal assist, and Needs additional practice         3. How to read and record intake and output correctly? No  Preparation for discharge completed: Placed prescription recommendations in discharge navigator for MD to sign, Ensured patient has extra supplies for discharge, Discussed how to order supplies after discharge , Ordered samples from  after gaining consent from patient/caregiver, Discussed how and when to make an outpatient WO nurse appointment after discharge, Prepared for discharge home with home care, and Discuss signs/symptoms of when to seek medical attention  Preparation for discharge still needed: complete  Pt support system on discharge: family  [facility] recommend home care? Yes  Face to face time: 35 minutes           Treatment Plan:     RUQ Urostomy pouching plan:   Pouching system: ostomy supplies pouches: Carmen Urine 2 pc ([id number])  ostomy supplies barrier: [place] 57mm FLAT ([id number])  Accessories used: [facility] ostomy accessories:  "2\" Cera Barrier Ring ([id number])   Frequency of pouch changes: Twice weekly and PRN leakage  WOC follow up plan: Daily Monday-Friday (as able)  Bedside RN interventions: Change pouch PRN if leaking using the supplies above, Empty pouch when 1/3 to 1/2 full, ensure to clean pouch outlet after emptying to prevent odor, Notify WOC for ongoing pouch leakage, Document stoma appearance and output volume, color, and consistency every shift, and Encourage patient to empty pouch with assist      Orders: Reviewed    RECOMMEND PRIMARY TEAM ORDER: None, at this time  Education provided: plan of care  Discussed plan of care with: Patient and Family  WOC nurse follow-up plan: daily  Notify WOC if wound(s) deteriorate.  Nursing to notify the Provider(s) and re-consult the WOC Nurse if new skin concern.    DATA:     Current support surface: Standard  Standard gel mattress (Isoflex)  Containment of urine/stool: Continent of bowel and Urostomy pouch  BMI: Body mass index is 31.25 kg/m .   Active diet order: Orders Placed This Encounter      Full Liquid Diet     Output: I/O last 3 completed shifts:  In: 1680 [P.O.:1680]  Out: 2665 [Urine:1995; Drains:670]     Labs:   Recent Labs   Lab 10/28/24  0614 10/23/24  0604 10/22/24  1738   HGB 12.9*   < > 10.4*   WBC 13.3*   < > 12.2*   A1C  --   --  5.3    < > = values in this interval not displayed.     Pressure injury risk assessment:   Sensory Perception: 4-->no impairment  Moisture: 4-->rarely moist  Activity: 3-->walks occasionally  Mobility: 3-->slightly limited  Nutrition: 2-->probably inadequate  Friction and Shear: 3-->no apparent problem  Cesar Score: 19    ARCHIE McclellandN RN CWOCN  Pager no longer in use, please contact through [employer]: [facility]      "

## 2024-10-28 NOTE — PROGRESS NOTES
"  [facility] - POSTOP PROGRESS NOTE    PLACE OF SERVICE:  [facility]     SURGERY: POD #6 after Robotic-assisted laparoscopic radical cystoprostatectomy, Bilateral pelvic lymphadenectomy, Ileal conduit urinary diversion by Dr. Lynch for urothelial cell carcinoma      SUBJECTIVE:   Events: no acute events overnight. Pt reports persistent abdominal distention and discomfort. Tolerating full liquids better today than yesterday. Passing some gas. Last BM 10/26 after dulcolax. No result from dulcolax 10/27. Denies fever, chills, N/V, flank pain. WOC RN in room for bag change today.     PO fluid intake 1440 ml so far today, 630 ml on 10/26.     Spouse present in room.     OBJECTIVE:  PHYSICAL EXAM  Vital signs:  Temp: 97.4  F (36.3  C) Temp src: Oral BP: 125/83 Pulse: 88   Resp: 18 SpO2: 99 % O2 Device: None (Room air) Oxygen Delivery: 1.5 LPM Height: 175.3 cm (5' 9\") Weight: 96 kg (211 lb 9.6 oz)  Estimated body mass index is 31.25 kg/m  as calculated from the following:    Height as of this encounter: 1.753 m (5' 9\").    Weight as of this encounter: 96 kg (211 lb 9.6 oz).    General: NAD, alert, cooperative  Neuro: A&O x 3. Moving all extremities equally.   Resp: reg resp rate/depth.   Abdomen: Semi-soft, moderately distended and tender abdomen.  Urostomy stoma is pink in color, ureterostomy tube tips x 2 noted extending from stoma. Urostomy draining yellow urine with some sediment, 1500 ml output 10/26 and 1795 ml so far today . KAVIN draining alyssa colored serous fluid, 670 ml output on 10/26, 610 ml so far today.   Incisions: C/D/I, closed with staples, light pink without excessive warmth or drainage along staples.   LE: CWMS, no bilateral LE edema.     LABS:  No results found for: \"INR\"   Lab Results   Component Value Date    WBC 11.8 10/24/2024     Lab Results   Component Value Date    RBC 3.78 10/24/2024     Lab Results   Component Value Date    HGB 11.1 10/24/2024     Lab Results   Component Value " Date    HCT 35.5 10/24/2024     Lab Results   Component Value Date    MCV 94 10/24/2024     Lab Results   Component Value Date    MCH 29.4 10/24/2024     Lab Results   Component Value Date    MCHC 31.3 10/24/2024     Lab Results   Component Value Date    RDW 17.3 10/24/2024     Lab Results   Component Value Date     10/24/2024     Creatinine   Date Value Ref Range Status   10/28/2024 1.68 (H) 0.67 - 1.17 mg/dL Final     Sodium   Date Value Ref Range Status   10/28/2024 135 135 - 145 mmol/L Final     Potassium   Date Value Ref Range Status   10/28/2024 3.9 3.4 - 5.3 mmol/L Final     Magnesium   Date Value Ref Range Status   10/23/2024 2.3 1.7 - 2.3 mg/dL Final     UC: 50-100k pseudomonas aeruginosa  Susceptibility       Pseudomonas aeruginosa     AALIYAH     Amikacin <=2 ug/mL Susceptible     Cefepime 2 ug/mL Susceptible     Ceftazidime 4 ug/mL Susceptible     Ciprofloxacin <=0.25 ug/mL Susceptible     Levofloxacin 0.5 ug/mL Susceptible     Meropenem <=0.25 ug/mL Susceptible     Piperacillin/Tazobactam 8 ug/mL Susceptible     Tobramycin <=1 ug/mL Susceptible             Lab Results: personally reviewed.       ASSESSMENT/PLAN:  POD #6 after Robotic-assisted laparoscopic radical cystoprostatectomy, Bilateral pelvic lymphadenectomy, Ileal conduit urinary diversion by Dr. Lynch for urothelial cell carcinoma      - Post-op ileus noted on XR 10/23. CT 10/25 findings concerning for pyelonephritis R. Kidney, no evidence of bowel obstruction.  -  WBC stable, 13.3 today. Afebrile. UC 10/25: 50-100k pseudomonas aeruginosa. Continue IV ciprofloxacin.   - Tolerating full liquid diet. Passing some flatus but persistent distended and tender abdomen. Maintain on full liquids and monitor.    - Bowels: Continue naloxegol and senna-docusate.   - KAVIN drain output 610 ml so far today. Fluid creatinine consistent with serum on 10/24, so not concerning for urine leak. Urostomy output 1795 ml so far today. Suspect higher output 2/2  increased po intake.   - Creatinine rising (prior to Cipro initiation), 1.68 today (baseline 1.42-1.48). Received contrast 10/25. Will repeat 10/29. If worsening, consider Nephrology consult.   - Anticipate discharging with subcutaneous lovenox. Order placed for RN to teach.   - Appreciate [facility] RN assistance with urostomy management/teaching. Bag changed today.   - Appreciate Hospitalist's assistance with medical management.     Discussed with:  Dr. Syed Rehman, APRN, CNP  [facility]   [phone]    ADDENDUM, 1540:   Ureterostomy tubes flushed with 5 ml sterile saline, aspirated 5 ml pale yellow fluid from both (fine debris from left tube. Both ureterostomy tubes dripping urine after flushes, replaced in urostomy bag and bag sealed onto wafer.   Creatinine recheck in AM. If worsening, consider re-imaging.     Discussed with Dr. Lynch.     Jack Rehman, CNP

## 2024-10-28 NOTE — PLAN OF CARE
Occupational Therapy Discharge Summary    Reason for therapy discharge:    All goals and outcomes met, no further needs identified.    Progress towards therapy goal(s). See goals on Care Plan in [facility] electronic health record for goal details.  Goals met    Therapy recommendation(s):    No further therapy is recommended.

## 2024-10-28 NOTE — PROGRESS NOTES
Care Management Follow Up    Length of Stay (days): 6    Expected Discharge Date: 10/30/2024    Anticipated Discharge Plan:  home with home care (RN, PT, OT)    Transportation: Confirmed Family/friend    PT Recommendations: home with assist  OT Recommendations:  home with assist, home with home care occupational therapy     Barriers to Discharge: medical stability    Prior Living Situation: house with spouse    Discussed  Partnership in Safe Discharge Planning  document with patient/family: NA     Handoff Completed: No, handoff not indicated or clinically appropriate    Patient/Spokesperson Updated: No    Additional Information:  SW reviewed chart. ELYSSA spoke with Katalina in intake at [facility] (ph: [phone]). Katalina requests update related to when pt is ready for discharge. Home care orders being obtained through [facility]. Katalina reports speaking to the [facility] this morning who reports they will fax home care orders to [facility] today.     Next Steps: update Katalina in intake at [facility] when pt is ready for discharge.     DAVID Lozano at 9:27 AM on 10/28/24

## 2024-10-28 NOTE — PLAN OF CARE
Goal Outcome Evaluation:             Pt remains bloated after yesterday lunch, ambulated 750 feet outside the rooms, still feels  bloated. Had small brown mucous like stool early this morning.      850ml from urostomy and 230 ml from [place] drains.   Vital signs:  Temp: 98.5  F (36.9  C) Temp src: Oral BP: (!) 131/93 Pulse: 81   Resp: 18 SpO2: 94 % O2 Device: None (Room air) stable ex elevated bp. Continue to monitor

## 2024-10-28 NOTE — PLAN OF CARE
Problem: Adult Inpatient Plan of Care  Goal: Absence of Hospital-Acquired Illness or Injury  Intervention: Identify and Manage Fall Risk  Recent Flowsheet Documentation  Taken 10/27/2024 1534 by Maria C Chávez RN  Safety Promotion/Fall Prevention:   activity supervised   room door open   nonskid shoes/slippers when out of bed   assistive device/personal items within reach   mobility aid in reach   lighting adjusted   increase visualization of patient     Problem: Adult Inpatient Plan of Care  Goal: Absence of Hospital-Acquired Illness or Injury  Intervention: Prevent Infection  Recent Flowsheet Documentation  Taken 10/27/2024 1534 by Maria C Chávez RN  Infection Prevention:   rest/sleep promoted   hand hygiene promoted     Problem: Surgery Nonspecified  Goal: Effective Urinary Elimination  Outcome: Progressing   Urostomy intact and draining well.  Problem: Pain Acute  Goal: Optimal Pain Control and Function  Intervention: Develop Pain Management Plan  Recent Flowsheet Documentation  Taken 10/27/2024 2115 by Maria C Chávez RN  Pain Management Interventions: medication (see MAR)  Taken 10/27/2024 1534 by Maria C Chávez RN  Pain Management Interventions: medication (see MAR)  Abdominal pain managed with PRN dilaudid.    Problem: Infection  Goal: Absence of Infection Signs and Symptoms  Outcome: Progressing   Afebrile, iv abx given.  Problem: Activity Intolerance  Goal: Enhanced Capacity and Energy  Outcome: Progressing  Intervention: Optimize Activity Tolerance  Recent Flowsheet Documentation  Taken 10/27/2024 1700 by Maria C Chávez RN  Activity Management:   activity adjusted per tolerance   activity encouraged   ambulated outside room  Taken 10/27/2024 1534 by Maria C Chávez RN  Environmental Support: calm environment promoted   Ambulated in the hallways.

## 2024-10-28 NOTE — PROGRESS NOTES
"[facility]    Medicine Progress Note - Hospitalist Service    Date of Admission:  10/22/2024    Assessment & Plan   69-year-old male with bladder cancer admitted for surgery.  Postop course complicated by ileus and pyelonephritis.    #Muscle invasive bladder cancer  S/p robotic assisted laparoscopic cystoprostatectomy with bilateral pelvic lymph node dissection and extracorporeal ileal conduit urinary diversion 10/22  Urology primary  Pain control    #R pyelonephritis  Evident on CT 10/25pm  UCx grew pansensitive Pseudomonas aeruginosa  Zosyn switched to Cipro    #Postop ileus  Having flatus and small BMs  Suppository HS  Full liquids for now, advance per surgery rec    #VAMSHI on CKD stage II, nonoliguric  #Anion gap metabolic acidosis, improving  S/p IV fluids  Good urine output: 1500, 1795, 1025 (thus far today), monitor  Received contrast 10/25  Avoid nephrotoxic agents and hypotension  Monitor    #Elevated blood pressure, improved  Likely due to pain, no prior history of hypertension  As needed IV hydralazine     #History of diabetes type 2  A1c 5.3  Holding metformin  Stopped glucose checks     #KRYS  CPAP      VTE ppx: sc Heparin          Diet: Full Liquid Diet    Lombardo Catheter: Not present  Lines: None     Cardiac Monitoring: None  Code Status: Full Code      Clinically Significant Risk Factors                             # Obesity: Estimated body mass index is 31.25 kg/m  as calculated from the following:    Height as of this encounter: 1.753 m (5' 9\").    Weight as of this encounter: 96 kg (211 lb 9.6 oz).      # Financial/Environmental Concerns: none         Disposition Plan     Medically Ready for Discharge: Anticipated in 2-4 Days             Carter Mendoza,   Hospitalist Service  [facility]  Securely message with [employer] (more info)  Text page via [facility] Paging/Directory   ______________________________________________________________________    Interval " History   Reports flatus this morning but no BM.  Developed pain last night after someone putting and chocolate milk.  Pain improved this morning.  No vomiting    Physical Exam   Vital Signs: Temp: 97.4  F (36.3  C) Temp src: Oral BP: 125/83 Pulse: 88   Resp: 18 SpO2: 99 % O2 Device: None (Room air)    Weight: 211 lbs 9.6 oz    General Appearance:  No acute distress  Respiratory: Clear to auscultation bilaterally  Cardiovascular: Regular rate and rhythm  GI: Hypoactive bowel sounds, abdomen is soft, distended with no rebound    Medical Decision Making       50 MINUTES SPENT BY ME on the date of service doing chart review, history, exam, documentation & further activities per the note.      Data

## 2024-10-29 LAB
ALBUMIN SERPL BCG-MCNC: 3.2 G/DL (ref 3.5–5.2)
ALP SERPL-CCNC: 68 U/L (ref 40–150)
ALT SERPL W P-5'-P-CCNC: 38 U/L (ref 0–70)
ANION GAP SERPL CALCULATED.3IONS-SCNC: 10 MMOL/L (ref 7–15)
AST SERPL W P-5'-P-CCNC: 27 U/L (ref 0–45)
BASOPHILS # BLD AUTO: 0.1 10E3/UL (ref 0–0.2)
BASOPHILS NFR BLD AUTO: 1 %
BILIRUB SERPL-MCNC: 0.3 MG/DL
BUN SERPL-MCNC: 24.8 MG/DL (ref 8–23)
CALCIUM SERPL-MCNC: 8.8 MG/DL (ref 8.8–10.4)
CHLORIDE SERPL-SCNC: 100 MMOL/L (ref 98–107)
CREAT SERPL-MCNC: 1.51 MG/DL (ref 0.67–1.17)
EGFRCR SERPLBLD CKD-EPI 2021: 50 ML/MIN/1.73M2
EOSINOPHIL # BLD AUTO: 0.4 10E3/UL (ref 0–0.7)
EOSINOPHIL NFR BLD AUTO: 4 %
ERYTHROCYTE [DISTWIDTH] IN BLOOD BY AUTOMATED COUNT: 15.4 % (ref 10–15)
GLUCOSE SERPL-MCNC: 108 MG/DL (ref 70–99)
HCO3 SERPL-SCNC: 23 MMOL/L (ref 22–29)
HCT VFR BLD AUTO: 36.3 % (ref 40–53)
HGB BLD-MCNC: 11.9 G/DL (ref 13.3–17.7)
IMM GRANULOCYTES # BLD: 0.2 10E3/UL
IMM GRANULOCYTES NFR BLD: 2 %
LYMPHOCYTES # BLD AUTO: 2 10E3/UL (ref 0.8–5.3)
LYMPHOCYTES NFR BLD AUTO: 20 %
MCH RBC QN AUTO: 29.5 PG (ref 26.5–33)
MCHC RBC AUTO-ENTMCNC: 32.8 G/DL (ref 31.5–36.5)
MCV RBC AUTO: 90 FL (ref 78–100)
MONOCYTES # BLD AUTO: 1.2 10E3/UL (ref 0–1.3)
MONOCYTES NFR BLD AUTO: 13 %
NEUTROPHILS # BLD AUTO: 5.9 10E3/UL (ref 1.6–8.3)
NEUTROPHILS NFR BLD AUTO: 61 %
NRBC # BLD AUTO: 0 10E3/UL
NRBC BLD AUTO-RTO: 0 /100
PLATELET # BLD AUTO: 257 10E3/UL (ref 150–450)
POTASSIUM SERPL-SCNC: 4 MMOL/L (ref 3.4–5.3)
PROT SERPL-MCNC: 6.1 G/DL (ref 6.4–8.3)
RBC # BLD AUTO: 4.03 10E6/UL (ref 4.4–5.9)
SODIUM SERPL-SCNC: 133 MMOL/L (ref 135–145)
WBC # BLD AUTO: 9.7 10E3/UL (ref 4–11)

## 2024-10-29 PROCEDURE — 82310 ASSAY OF CALCIUM: CPT | Performed by: HOSPITALIST

## 2024-10-29 PROCEDURE — 250N000013 HC RX MED GY IP 250 OP 250 PS 637: Performed by: INTERNAL MEDICINE

## 2024-10-29 PROCEDURE — 120N000001 HC R&B MED SURG/OB

## 2024-10-29 PROCEDURE — 250N000011 HC RX IP 250 OP 636: Performed by: STUDENT IN AN ORGANIZED HEALTH CARE EDUCATION/TRAINING PROGRAM

## 2024-10-29 PROCEDURE — 99233 SBSQ HOSP IP/OBS HIGH 50: CPT | Performed by: HOSPITALIST

## 2024-10-29 PROCEDURE — 250N000011 HC RX IP 250 OP 636: Performed by: HOSPITALIST

## 2024-10-29 PROCEDURE — 250N000013 HC RX MED GY IP 250 OP 250 PS 637: Performed by: HOSPITALIST

## 2024-10-29 PROCEDURE — 250N000013 HC RX MED GY IP 250 OP 250 PS 637: Performed by: STUDENT IN AN ORGANIZED HEALTH CARE EDUCATION/TRAINING PROGRAM

## 2024-10-29 PROCEDURE — 85004 AUTOMATED DIFF WBC COUNT: CPT | Performed by: HOSPITALIST

## 2024-10-29 RX ORDER — AMOXICILLIN 250 MG
2 CAPSULE ORAL 2 TIMES DAILY
Status: DISCONTINUED | OUTPATIENT
Start: 2024-10-29 | End: 2024-10-31 | Stop reason: HOSPADM

## 2024-10-29 RX ORDER — HEPARIN SODIUM 5000 [USP'U]/.5ML
5000 INJECTION, SOLUTION INTRAVENOUS; SUBCUTANEOUS EVERY 12 HOURS
Status: DISCONTINUED | OUTPATIENT
Start: 2024-10-30 | End: 2024-10-31 | Stop reason: HOSPADM

## 2024-10-29 RX ADMIN — PANTOPRAZOLE SODIUM 40 MG: 40 TABLET, DELAYED RELEASE ORAL at 06:16

## 2024-10-29 RX ADMIN — SENNOSIDES AND DOCUSATE SODIUM 1 TABLET: 8.6; 5 TABLET ORAL at 09:46

## 2024-10-29 RX ADMIN — HYDROMORPHONE HYDROCHLORIDE 3 MG: 2 TABLET ORAL at 10:06

## 2024-10-29 RX ADMIN — HEPARIN, PORCINE (PF) 10 UNIT/ML INTRAVENOUS SYRINGE 5 ML: at 16:49

## 2024-10-29 RX ADMIN — HYDROMORPHONE HYDROCHLORIDE 3 MG: 2 TABLET ORAL at 13:18

## 2024-10-29 RX ADMIN — BISACODYL 10 MG: 10 SUPPOSITORY RECTAL at 21:07

## 2024-10-29 RX ADMIN — SENNOSIDES AND DOCUSATE SODIUM 2 TABLET: 8.6; 5 TABLET ORAL at 21:07

## 2024-10-29 RX ADMIN — ACETAMINOPHEN 650 MG: 325 TABLET ORAL at 14:43

## 2024-10-29 RX ADMIN — HYDROMORPHONE HYDROCHLORIDE 3 MG: 2 TABLET ORAL at 16:49

## 2024-10-29 RX ADMIN — NALOXEGOL OXALATE 12.5 MG: 12.5 TABLET, FILM COATED ORAL at 06:16

## 2024-10-29 RX ADMIN — ROSUVASTATIN CALCIUM 40 MG: 40 TABLET, FILM COATED ORAL at 21:07

## 2024-10-29 RX ADMIN — HEPARIN SODIUM 5000 UNITS: 10000 INJECTION, SOLUTION INTRAVENOUS; SUBCUTANEOUS at 00:04

## 2024-10-29 RX ADMIN — MAGNESIUM HYDROXIDE 30 ML: 400 SUSPENSION ORAL at 12:13

## 2024-10-29 RX ADMIN — CIPROFLOXACIN 400 MG: 2 INJECTION, SOLUTION INTRAVENOUS at 21:57

## 2024-10-29 RX ADMIN — HYDROMORPHONE HYDROCHLORIDE 3 MG: 2 TABLET ORAL at 21:07

## 2024-10-29 RX ADMIN — CIPROFLOXACIN 400 MG: 2 INJECTION, SOLUTION INTRAVENOUS at 09:47

## 2024-10-29 RX ADMIN — HEPARIN SODIUM 5000 UNITS: 10000 INJECTION, SOLUTION INTRAVENOUS; SUBCUTANEOUS at 09:46

## 2024-10-29 NOTE — PLAN OF CARE
Problem: Surgery Nonspecified  Goal: Absence of Bleeding  Outcome: Progressing  Problem: Surgery Nonspecified  Goal: Effective Bowel Elimination  Outcome: Progressing  Problem: Surgery Nonspecified  Goal: Optimal Pain Control and Function  Outcome: Progressing  Intervention: Prevent or Manage Pain  Problem: Infection  Goal: Absence of Infection Signs and Symptoms  Outcome: Progressing    Patient reports abdominal pain. Given PRN dilaudid. VSS. Patient reports abdominal distension. Bowel sounds hypoactive and patient is passing gas.     Urosotomy patent and intact. KAVIN drain patent and draining. Output charted. Tolerating full liquid diet.     Patient lost IV access. Now port is accessed for IV abx tonight. Patient educated on plan of care. Answered all questions and concerns. Verbalized understanding, plan of care ongoing.

## 2024-10-29 NOTE — PLAN OF CARE
Problem: Adult Inpatient Plan of Care  Goal: Plan of Care Review  Description: The Plan of Care Review/Shift note should be completed every shift.  The Outcome Evaluation is a brief statement about your assessment that the patient is improving, declining, or no change.  This information will be displayed automatically on your shift  note.  Outcome: Progressing   Goal Outcome Evaluation:  Tolerating regular diet.  Passing gas but taking narcotics.  Milk of magnesia given.  Await BM.  Ambulating in hallway and in room.  Wife at bedside.    Problem: Surgery Nonspecified  Goal: Optimal Pain Control and Function  Outcome: Progressing  Intervention: Prevent or Manage Pain  Recent Flowsheet Documentation  Taken 10/29/2024 1011 by Meena Torres, RN  Pain Management Interventions: medication (see MAR)  Pain improved with hydromorphone 3 mg oral.  States comes down to a 3/10. Can have every 3 hours as needed.

## 2024-10-29 NOTE — PROGRESS NOTES
"[facility]    Medicine Progress Note - Hospitalist Service    Date of Admission:  10/22/2024    Assessment & Plan   69-year-old male with bladder cancer admitted for surgery.  Postop course complicated by ileus and pyelonephritis.    #Muscle invasive bladder cancer, urothelial cell  S/p robotic assisted laparoscopic cystoprostatectomy with bilateral pelvic lymph node dissection and extracorporeal ileal conduit urinary diversion 10/22  Urology primary  Pain control    #R pyelonephritis  Evident on CT 10/25pm  UCx grew pansensitive Pseudomonas aeruginosa  Zosyn switched to Cipro    #Postop ileus  Having flatus and small BMs  Suppository HS, Senna-colace BID, add MoM  Advanced to regular diet    #VAMSHI on CKD stage II, nonoliguric  #Anion gap metabolic acidosis, improving  S/p IV fluids  Cr peaked 1.68, came down today, monitor trend  Good urine output, not polyuric  Received contrast 10/25  Avoid nephrotoxic agents and hypotension    #Elevated blood pressure, improved  Likely due to pain, no prior history of hypertension  As needed IV hydralazine     #History of diabetes type 2  A1c 5.3  Holding metformin  Stopped glucose checks     #KRYS  CPAP      VTE ppx: sc Heparin          Diet: Regular Diet Adult  Snacks/Supplements Adult: Ensure Enlive; Between Meals    Lombardo Catheter: Not present  Lines: PRESENT      Port a Cath 10/28/24 Single Lumen Right Chest wall-Site Assessment: WDL      Cardiac Monitoring: None  Code Status: Full Code      Clinically Significant Risk Factors         # Hyponatremia: Lowest Na = 133 mmol/L in last 2 days, will monitor as appropriate       # Hypoalbuminemia: Lowest albumin = 3.2 g/dL at 10/29/2024  6:13 AM, will monitor as appropriate               # Obesity: Estimated body mass index is 30.85 kg/m  as calculated from the following:    Height as of this encounter: 1.753 m (5' 9\").    Weight as of this encounter: 94.8 kg (208 lb 14.4 oz).   # Severe Malnutrition: based " on nutrition assessment    # Financial/Environmental Concerns: none         Disposition Plan     Medically Ready for Discharge: Anticipated in 2-4 Days             Carter Mendoza DO  Hospitalist Service  [facility]  Securely message with [employer] (more info)  Text page via [employer] Paging/Directory   ______________________________________________________________________    Interval History   Still having flatus. No BM x2 days    Physical Exam   Vital Signs: Temp: 98.1  F (36.7  C) Temp src: Oral BP: 128/82 Pulse: 81   Resp: 18 SpO2: 97 % O2 Device: None (Room air)    Weight: 208 lbs 14.4 oz    General Appearance:  No acute distress  Respiratory: Clear to auscultation bilaterally  Cardiovascular: Regular rate and rhythm  GI: Hypoactive bowel sounds, abdomen is soft, mildly distended with no rebound    Medical Decision Making       50 MINUTES SPENT BY ME on the date of service doing chart review, history, exam, documentation & further activities per the note.      Data

## 2024-10-29 NOTE — PROGRESS NOTES
"  [facility] - POSTOP PROGRESS NOTE    PLACE OF SERVICE:  [facility]     SURGERY: POD #7 after Robotic-assisted laparoscopic radical cystoprostatectomy, Bilateral pelvic lymphadenectomy, Ileal conduit urinary diversion by Dr. Lynch for urothelial cell carcinoma      SUBJECTIVE:   Events: no acute events overnight. Pt reports feeling better today. +BM last evening. Tolerated regular diet this AM but notes early satiety. Is passing flatus. Denies fever, chills, N/V, flank pain.     Spouse present in room.     OBJECTIVE:  PHYSICAL EXAM  Vital signs:  Temp: 98.1  F (36.7  C) Temp src: Oral BP: 128/82 Pulse: 81   Resp: 18 SpO2: 97 % O2 Device: None (Room air) Oxygen Delivery: 1.5 LPM Height: 175.3 cm (5' 9\") Weight: 94.8 kg (208 lb 14.4 oz)  Estimated body mass index is 30.85 kg/m  as calculated from the following:    Height as of this encounter: 1.753 m (5' 9\").    Weight as of this encounter: 94.8 kg (208 lb 14.4 oz).    General: NAD, alert, cooperative  Neuro: A&O x 3. Moving all extremities equally.   Resp: reg resp rate/depth.   Abdomen: Soft, mildly distended and appropriately tender abdomen.  Urostomy stoma is pink in color, ureterostomy tube tips x 2 noted extending from stoma. Urostomy draining yellow urine with some sediment, 2375 ml output 10/28 and 1450 ml so far today. KAVIN draining serous fluid, 840 ml output on 10/28, 425 ml so far today.   Incisions: C/D/I, closed with staples.    LE: CWMS, no bilateral LE edema.     LABS:  No results found for: \"INR\"   Lab Results   Component Value Date    WBC 9.7 10/29/2024     Lab Results   Component Value Date    RBC 4.03 10/29/2024     Lab Results   Component Value Date    HGB 11.9 10/29/2024     Lab Results   Component Value Date    HCT 36.3 10/29/2024     Lab Results   Component Value Date    MCV 90 10/29/2024     Lab Results   Component Value Date    MCH 29.5 10/29/2024     Lab Results   Component Value Date    MCHC 32.8 10/29/2024     Lab Results "   Component Value Date    RDW 15.4 10/29/2024     Lab Results   Component Value Date     10/29/2024     Creatinine   Date Value Ref Range Status   10/29/2024 1.51 (H) 0.67 - 1.17 mg/dL Final     Sodium   Date Value Ref Range Status   10/29/2024 133 (L) 135 - 145 mmol/L Final     Potassium   Date Value Ref Range Status   10/29/2024 4.0 3.4 - 5.3 mmol/L Final     Magnesium   Date Value Ref Range Status   10/23/2024 2.3 1.7 - 2.3 mg/dL Final     UC: 50-100k pseudomonas aeruginosa  Susceptibility       Pseudomonas aeruginosa     AALIYAH     Amikacin <=2 ug/mL Susceptible     Cefepime 2 ug/mL Susceptible     Ceftazidime 4 ug/mL Susceptible     Ciprofloxacin <=0.25 ug/mL Susceptible     Levofloxacin 0.5 ug/mL Susceptible     Meropenem <=0.25 ug/mL Susceptible     Piperacillin/Tazobactam 8 ug/mL Susceptible     Tobramycin <=1 ug/mL Susceptible             Lab Results: personally reviewed.       ASSESSMENT/PLAN:  POD #7 after Robotic-assisted laparoscopic radical cystoprostatectomy, Bilateral pelvic lymphadenectomy, Ileal conduit urinary diversion by Dr. Lynch for urothelial cell carcinoma      - Post-op ileus noted on XR 10/23. CT 10/25 findings concerning for pyelonephritis R. Kidney, no evidence of bowel obstruction.  -  WBC normalized today. Afebrile. UC 10/25: 50-100k pseudomonas aeruginosa. Continue ciprofloxacin to complete 10-14 day course.    - Regular diet, early satiety. Encouraged small frequent meals.   - Bowels: Continue naloxegol and senna-docusate.   - KAVIN drain 425ml output so far today. Fluid creatinine consistent with serum on 10/24. Urostomy output 1450 ml so far today. PO fluid intake 840 ml so far today.   - Creatinine improving, 1.51 today (baseline 1.42-1.48). Received contrast 10/25. Flushed bilateral ureterostomy tubes on 10/28. Monitor.   - Planning to discharge with subcutaneous lovenox, RN instructing patient.   - Appreciate WO RN assistance with urostomy management/teaching. Anticipate  will need [facility] for continue ostomy teaching/management.   - Appreciate Hospitalist's assistance with medical management.     Discussed with:  Dr. Syed Rehman, APRN, CNP  [facility]   [phone]

## 2024-10-29 NOTE — PROGRESS NOTES
Gave pt handout regarding Lovenox teaching.  Reviewed instructions regarding how to give lovenox shots and also encouraged pt and wife to view the [employer] website for a video instruction on how to give self lovenox shots.

## 2024-10-29 NOTE — PLAN OF CARE
Problem: Adult Inpatient Plan of Care  Goal: Optimal Comfort and Wellbeing  Outcome: Progressing     Problem: Activity Intolerance  Goal: Enhanced Capacity and Energy  Outcome: Progressing  Intervention: Optimize Activity Tolerance  Recent Flowsheet Documentation  Taken 10/29/2024 0009 by Max Ray RN  Activity Management: up ad genny     Problem: Surgery Nonspecified  Goal: Optimal Pain Control and Function  Outcome: Progressing   Goal Outcome Evaluation:      Plan of Care Reviewed With: patient    Overall Patient Progress: improving       Asleep most of the night.  A&O x4.  Denies pain.  Independent.  Plan of care ongoing.

## 2024-10-29 NOTE — PROGRESS NOTES
"CLINICAL NUTRITION SERVICES - ASSESSMENT NOTE     Nutrition Prescription    RECOMMENDATIONS FOR MDs/PROVIDERS TO ORDER:  Monitor Glucose with diet advancement and increasing oral intakes     Malnutrition Status:    Severe malnutrition  In Context of:  Acute illness or injury    Recommendations already ordered by Registered Dietitian (RD):    Future/Additional Recommendations:  Monitor po intake, supplement annamaria, weight, labs, I/Os.   Monitor need to adjust Ensure enlive to Glucerna pending glucose trends      REASON FOR ASSESSMENT  Lobito Zheng is a/an 69 year old male assessed by the dietitian for LOS    HPI: Pt with bladder cancer admitted for surgery.  Postop course complicated by ileus and pyelonephritis.     NUTRITION HISTORY  Met with pt at bedside this AM. Pt reports good appetite, wanting to eat. Pt NPO/CL/FL diet x7 days. Unintentional weight loss this admit with inadequate oral intakes. Pt notes some abd pain. Pt denies any nausea or vomiting. MD ordering ensure enlive TID, pt reporting preference for chocolate/strawberry.   NKFA    CURRENT NUTRITION ORDERS  Diet: Regular  Nutrition Supplement: Ensure enlive TID per MD order  Intake/Tolerance:   10/22-26 NPO/CL  10/27-28 FL  Pt with inadequate oral intakes <50% nutrition needs > 7 days     LABS  Labs reviewed  Na 133(L)   BUN 24.8(H)   Creat 1.51(H)     MEDICATIONS  Medications reviewed  Scheduled cipro, movantik, protonix, crestor, senna    ANTHROPOMETRICS  Height: 175.3 cm (5' 9\")  Most Recent Weight: 94.8 kg (208 lb 14.4 oz)    IBW: 72.7 kg  BMI: Obesity Grade I BMI 30-34.9  Weight History: 7.3% wt loss x1 week, 10% wt loss x5 months   Wt Readings from Last 10 Encounters:   10/29/24 94.8 kg (208 lb 14.4 oz)   10/1/24  103.6 kg (228 lb 6.4 oz)  9/5/24   99.3 kg (219 lb)   8/29/24  99.5 kg (219 lb 6.4 oz)   5/23/24  105.4 kg (232 lb 6.4 oz)   11/3/21  112.5 kg (248 lb)     Date/Time Weight Weight Method   10/29/24 0739 94.8 kg (208 lb 14.4 oz) Standing " scale   10/28/24 0850 96 kg (211 lb 9.6 oz) Standing scale   10/26/24 1451 96.4 kg (212 lb 8 oz) Standing scale   10/25/24 0757 97.8 kg (215 lb 8 oz) Standing scale   10/24/24 1219 100.4 kg (221 lb 5.5 oz) Bed scale   10/23/24 1025 102.3 kg (225 lb 8 oz) Standing scale   10/22/24 1729 104 kg (229 lb 4.5 oz) --   10/22/24 0550 101.6 kg (224 lb) Standing scale     Dosing Weight: 78.2 kg adjusted wt    ASSESSED NUTRITION NEEDS  Estimated Energy Needs: 0223-3365 kcals/day (25 - 30 kcals/kg)  Justification: Maintenance  Estimated Protein Needs: 78-94 grams protein/day (1 - 1.2 grams of pro/kg)  Justification: Increased needs and CKD  Estimated Fluid Needs: 0084-0060 mL/day (25 - 30 mL/kg)   Justification: Maintenance    PHYSICAL FINDINGS/GI  See malnutrition section below.  Per chart review  Rounded/distended abd   Last BM x1 10/27   Abd drain, OP: 840 ml 10/28, 130 ml this AM  I/Os: -14.3 L this admit, incomplete   Urostomy   Incision/surgical sites   Edema: Trace/Mild L-hand, ankles     MALNUTRITION:  % Weight Loss:  > 2% in 1 week (severe malnutrition)  % Intake:  </= 50% for >/= 5 days (severe malnutrition)  Subcutaneous Fat Loss:  None observed  Muscle Loss:  None observed  Fluid Retention:  Trace/Mild L-hand, ankles     Malnutrition Diagnosis: Severe malnutrition  In Context of:  Acute illness or injury    NUTRITION DIAGNOSIS  Malnutrition related to post-op ileus as evidenced by need for NPO/CL/FL diet x7 days with oral intakes <50% nutrition needs.       INTERVENTIONS  Implementation  Medical food supplement therapy - Continue Ensure enlive TID per MD order    Goals  Pt to meet >75% nutritional needs   Maintain weight   Electrolytes WNL   BG < 180      Monitoring/Evaluation  Progress toward goals will be monitored and evaluated per protocol.

## 2024-10-29 NOTE — PLAN OF CARE
Problem: Surgery Nonspecified  Goal: Absence of Bleeding  Outcome: Progressing  Goal: Absence of Infection Signs and Symptoms  Outcome: Progressing  Goal: Anesthesia/Sedation Recovery  Intervention: Optimize Anesthesia Recovery  Recent Flowsheet Documentation  Taken 10/28/2024 2047 by Shobha Archer RN  Safety Promotion/Fall Prevention:   activity supervised   clutter free environment maintained   nonskid shoes/slippers when out of bed   safety round/check completed  Goal: Optimal Pain Control and Function  Intervention: Prevent or Manage Pain  Recent Flowsheet Documentation  Taken 10/28/2024 2054 by Shobha Archer RN  Pain Management Interventions: medication (see MAR)  Goal: Effective Oxygenation and Ventilation  Intervention: Optimize Oxygenation and Ventilation  Recent Flowsheet Documentation  Taken 10/28/2024 2047 by Shobha Archer RN  Cough And Deep Breathing: done independently per patient  Activity Management: up ad genny     Problem: Pain Acute  Goal: Optimal Pain Control and Function  Outcome: Progressing  Intervention: Develop Pain Management Plan  Recent Flowsheet Documentation  Taken 10/28/2024 2054 by Shobha Archer RN  Pain Management Interventions: medication (see MAR)  Intervention: Prevent or Manage Pain  Recent Flowsheet Documentation  Taken 10/28/2024 2047 by Shobha Archer RN  Medication Review/Management: medications reviewed   Goal Outcome Evaluation:         Patient ambulated in baker independently. Pain to abdomen rated 6/10. PRN Dilaudid given and was effective for pain. Urostomy output 550mL. KAVIN drain output 275mL for PM shift. KAVIN output pale pink/yellow. Staples to abdominal incisions, CDI. Continues to receive IV cipro. Tolerating full liquid diet well. Medium BM-soft brown with some mucous. VSS on RA.

## 2024-10-30 LAB
ANION GAP SERPL CALCULATED.3IONS-SCNC: 10 MMOL/L (ref 7–15)
BUN SERPL-MCNC: 25 MG/DL (ref 8–23)
CALCIUM SERPL-MCNC: 8.9 MG/DL (ref 8.8–10.4)
CHLORIDE SERPL-SCNC: 99 MMOL/L (ref 98–107)
CREAT SERPL-MCNC: 1.48 MG/DL (ref 0.67–1.17)
EGFRCR SERPLBLD CKD-EPI 2021: 52 ML/MIN/1.73M2
GLUCOSE SERPL-MCNC: 146 MG/DL (ref 70–99)
HCO3 SERPL-SCNC: 24 MMOL/L (ref 22–29)
POTASSIUM SERPL-SCNC: 4.5 MMOL/L (ref 3.4–5.3)
SODIUM SERPL-SCNC: 133 MMOL/L (ref 135–145)

## 2024-10-30 PROCEDURE — 250N000013 HC RX MED GY IP 250 OP 250 PS 637: Performed by: INTERNAL MEDICINE

## 2024-10-30 PROCEDURE — 120N000001 HC R&B MED SURG/OB

## 2024-10-30 PROCEDURE — 94660 CPAP INITIATION&MGMT: CPT

## 2024-10-30 PROCEDURE — G0463 HOSPITAL OUTPT CLINIC VISIT: HCPCS

## 2024-10-30 PROCEDURE — 250N000013 HC RX MED GY IP 250 OP 250 PS 637: Performed by: STUDENT IN AN ORGANIZED HEALTH CARE EDUCATION/TRAINING PROGRAM

## 2024-10-30 PROCEDURE — 250N000011 HC RX IP 250 OP 636: Performed by: HOSPITALIST

## 2024-10-30 PROCEDURE — 99232 SBSQ HOSP IP/OBS MODERATE 35: CPT | Performed by: HOSPITALIST

## 2024-10-30 PROCEDURE — 250N000013 HC RX MED GY IP 250 OP 250 PS 637: Performed by: HOSPITALIST

## 2024-10-30 PROCEDURE — 80048 BASIC METABOLIC PNL TOTAL CA: CPT | Performed by: HOSPITALIST

## 2024-10-30 PROCEDURE — 82570 ASSAY OF URINE CREATININE: CPT | Performed by: STUDENT IN AN ORGANIZED HEALTH CARE EDUCATION/TRAINING PROGRAM

## 2024-10-30 PROCEDURE — 999N000157 HC STATISTIC RCP TIME EA 10 MIN

## 2024-10-30 RX ORDER — CIPROFLOXACIN 500 MG/1
500 TABLET, FILM COATED ORAL EVERY 12 HOURS SCHEDULED
Status: DISCONTINUED | OUTPATIENT
Start: 2024-10-30 | End: 2024-10-31 | Stop reason: HOSPADM

## 2024-10-30 RX ADMIN — PANTOPRAZOLE SODIUM 40 MG: 40 TABLET, DELAYED RELEASE ORAL at 06:28

## 2024-10-30 RX ADMIN — CIPROFLOXACIN 500 MG: 500 TABLET ORAL at 20:25

## 2024-10-30 RX ADMIN — HEPARIN, PORCINE (PF) 10 UNIT/ML INTRAVENOUS SYRINGE 5 ML: at 16:37

## 2024-10-30 RX ADMIN — HYDROMORPHONE HYDROCHLORIDE 3 MG: 2 TABLET ORAL at 16:04

## 2024-10-30 RX ADMIN — HYDROMORPHONE HYDROCHLORIDE 3 MG: 2 TABLET ORAL at 20:21

## 2024-10-30 RX ADMIN — HYDROMORPHONE HYDROCHLORIDE 3 MG: 2 TABLET ORAL at 11:37

## 2024-10-30 RX ADMIN — NALOXEGOL OXALATE 12.5 MG: 12.5 TABLET, FILM COATED ORAL at 06:28

## 2024-10-30 RX ADMIN — ROSUVASTATIN CALCIUM 40 MG: 40 TABLET, FILM COATED ORAL at 20:21

## 2024-10-30 RX ADMIN — HEPARIN SODIUM 5000 UNITS: 10000 INJECTION, SOLUTION INTRAVENOUS; SUBCUTANEOUS at 20:21

## 2024-10-30 RX ADMIN — SENNOSIDES AND DOCUSATE SODIUM 2 TABLET: 8.6; 5 TABLET ORAL at 20:25

## 2024-10-30 RX ADMIN — CIPROFLOXACIN 400 MG: 2 INJECTION, SOLUTION INTRAVENOUS at 10:26

## 2024-10-30 RX ADMIN — HYDROMORPHONE HYDROCHLORIDE 3 MG: 2 TABLET ORAL at 06:33

## 2024-10-30 NOTE — PROGRESS NOTES
[facility] Nurse Inpatient Assessment     Consulted for: urostomy, ileal conduit    Summary: 10/30 Ostomy teaching activity, bathing, clothing, diet, exercise and home discussed. Pouch not changed today. Will plan for patient/wife to complete pouch change on 10/31/24.    10/25 [facility] will [facility] with [facility] to [facility], advised spouse to begin process and see what [facility] requires for DME ordering.    10/24 - rounded on patient earlier in the day; patient had a lot of visitors so we set a time for the afternoon. At 1400 wo rounded on patient, he was sitting in the chair, wife at bedside. Patient was having severe pain and cramping and declined a pouch change today. Will reassess and attempt another hands on session tomorrow if able. Below assessment kept for continuity.       Patient History (according to provider note(s):      Lobito Zheng is a 69 year old male with a past medical history documented below presented to the hospital for a robotic assisted laparoscopic cystoprostatectomy with bilateral pelvic lymph node dissection and extracorporeal ileal conduit urinary diversion and is s/p procedure     Assessment:      Areas visualized during today's visit: Abdomen    Assessment of new end Urostomy: (from 10/28/24 assessment)  Diagnosis Pertinent to Stoma: Cancer - Bladder      Surgery Date: 10/22/2024  Sur[facility]: [facility]  Pouching system in place on assessment today: Carmen two piece and cut to fit   Pouch barrier status: Edges lifting   Pouch last changed/wear time: 4 days  Reason for pouch change today: ostomy education and routine schedule  Effectiveness of current pouching/ supply plan: Needs soft convexity at discharge  possible   Change made with ostomy management today: No  Pouching system placed today: Foster two piece, cut to fit, and flat (abdomen very distended, once softened may need to adjust)  Supplies: gathered          Last  photo: 10/28  Stoma location: RLQ  Stoma size: 1 1/8 high x 1.25 wide inches, Ureteral stents  Stoma appearance: beefy red, oval, moist, and edematous  Mucocutaneous junction:  intact  Peristomal complication(s): none   Output: alyssa  Output volume emptied during visit: 0 ml  Abdominal assessment: Distended and Firm  Surgical site(s): staples intact  NG still in place? No  Pain: Sharp and Stabbing  Is patient still on a PCA? No    Ostomy education assessment: (below this line education from today, 10/30/24)  Participant of teaching session today: patient  and spouse  Education completed today: Fluid and electrolyte balance , Importance of hydration, When to seek medical attention, Low fiber diet , Infection prevention/hygiene , Hernia prevention, Lifestyle adjustments , and Discharge instructions  Educational materials/methods: Verbal, Written, and Addressed patient/caregiver questions/concerns  Education still needed: complete  Learning Comprehension:   Psychosocial assessment: ready but in a lot of pain  Patient readiness for education today: observing and attentive  Following today's visit: patient  and spouse is able to demonstrate;         1. How to empty their pouch? Yes        2. How to change their pouch? Yes, with minimal assist, and Needs additional practice         3. How to read and record intake and output correctly? No  Preparation for discharge completed: Placed prescription recommendations in discharge navigator for MD to sign, Ensured patient has extra supplies for discharge, Discussed how to order supplies after discharge , Ordered samples from  after gaining consent from patient/caregiver, Discussed how and when to make an outpatient WOC nurse appointment after discharge, Prepared for discharge home with home care, and Discuss signs/symptoms of when to seek medical attention  Preparation for discharge still needed: complete  Pt support system on discharge: family  WOC recommend home  "care? Yes  Face to face time: 25 minutes           Treatment Plan:     RUQ Urostomy pouching plan:   Pouching system: ostomy supplies pouches: Nelson Urine 2 pc ([id number])  ostomy supplies barrier: Nelson 57mm FLAT ([id number])  Accessories used: [facility] ostomy accessories: 2\" Cera Barrier Ring ([id number])   Frequency of pouch changes: Twice weekly and PRN leakage  WOC follow up plan: Daily Monday-Friday (as able)  Bedside RN interventions: Change pouch PRN if leaking using the supplies above, Empty pouch when 1/3 to 1/2 full, ensure to clean pouch outlet after emptying to prevent odor, Notify WOC for ongoing pouch leakage, Document stoma appearance and output volume, color, and consistency every shift, and Encourage patient to empty pouch with assist      Orders: Reviewed    RECOMMEND PRIMARY TEAM ORDER: None, at this time  Education provided: plan of care  Discussed plan of care with: Patient and Family  WOC nurse follow-up plan: daily  Notify WOC if wound(s) deteriorate.  Nursing to notify the Provider(s) and re-consult the WOC Nurse if new skin concern.    DATA:     Current support surface: Standard  Standard gel mattress (Isoflex)  Containment of urine/stool: Continent of bowel and Urostomy pouch  BMI: Body mass index is 30.85 kg/m .   Active diet order: Orders Placed This Encounter      Regular Diet Adult     Output: I/O last 3 completed shifts:  In: 840 [P.O.:840]  Out: 2790 [Urine:2050; Drains:740]     Labs:   Recent Labs   Lab 10/29/24  0614 10/29/24  0613   ALBUMIN  --  3.2*   HGB 11.9*  --    WBC 9.7  --      Pressure injury risk assessment:   Sensory Perception: 4-->no impairment  Moisture: 4-->rarely moist  Activity: 4-->walks frequently  Mobility: 3-->slightly limited  Nutrition: 3-->adequate  Friction and Shear: 3-->no apparent problem  Cesar Score: 21    Talya Rosen, MSN RN CWOCN  Pager no longer is use, please contact through [employer] group: [facility]        "

## 2024-10-30 NOTE — PROGRESS NOTES
"  [facility] - POSTOP PROGRESS NOTE    PLACE OF SERVICE:  [facility]     SURGERY: POD #8 after Robotic-assisted laparoscopic radical cystoprostatectomy, Bilateral pelvic lymphadenectomy, Ileal conduit urinary diversion by Dr. Lynch for urothelial cell carcinoma      SUBJECTIVE:   Events: no acute events overnight. Pt reports feeling better today. +BM x 2 today. Tolerated regular diet this AM.. Is passing flatus. Pain is improving. Denies fever, chills, N/V, flank pain.     Spouse present in room.     OBJECTIVE:  PHYSICAL EXAM  Vital signs:  Temp: 98.2  F (36.8  C) Temp src: Oral BP: 131/73 Pulse: 73   Resp: 16 SpO2: 98 % O2 Device: None (Room air) Oxygen Delivery: 1.5 LPM Height: 175.3 cm (5' 9\") Weight: 94.8 kg (208 lb 14.4 oz)  Estimated body mass index is 30.85 kg/m  as calculated from the following:    Height as of this encounter: 1.753 m (5' 9\").    Weight as of this encounter: 94.8 kg (208 lb 14.4 oz).    General: NAD, alert, cooperative  Neuro: A&O x 3. Moving all extremities equally.   Resp: reg resp rate/depth.   Abdomen: Soft, mildly distended and appropriately tender abdomen.  Urostomy stoma is pink in color, ureterostomy tube tips x 2 noted extending from stoma. Urostomy draining yellow urine with some sediment. KAVIN draining serous fluid, 740 ml output.  Incisions: C/D/I, closed with staples.    LE: CWMS, no bilateral LE edema.     LABS:  No results found for: \"INR\"   Lab Results   Component Value Date    WBC 9.7 10/29/2024     Lab Results   Component Value Date    RBC 4.03 10/29/2024     Lab Results   Component Value Date    HGB 11.9 10/29/2024     Lab Results   Component Value Date    HCT 36.3 10/29/2024     Lab Results   Component Value Date    MCV 90 10/29/2024     Lab Results   Component Value Date    MCH 29.5 10/29/2024     Lab Results   Component Value Date    MCHC 32.8 10/29/2024     Lab Results   Component Value Date    RDW 15.4 10/29/2024     Lab Results   Component Value Date "     10/29/2024     Creatinine   Date Value Ref Range Status   10/30/2024 1.48 (H) 0.67 - 1.17 mg/dL Final     Sodium   Date Value Ref Range Status   10/30/2024 133 (L) 135 - 145 mmol/L Final     Potassium   Date Value Ref Range Status   10/30/2024 4.5 3.4 - 5.3 mmol/L Final     Magnesium   Date Value Ref Range Status   10/23/2024 2.3 1.7 - 2.3 mg/dL Final     UC: 50-100k pseudomonas aeruginosa  Susceptibility       Pseudomonas aeruginosa     AALIYAH     Amikacin <=2 ug/mL Susceptible     Cefepime 2 ug/mL Susceptible     Ceftazidime 4 ug/mL Susceptible     Ciprofloxacin <=0.25 ug/mL Susceptible     Levofloxacin 0.5 ug/mL Susceptible     Meropenem <=0.25 ug/mL Susceptible     Piperacillin/Tazobactam 8 ug/mL Susceptible     Tobramycin <=1 ug/mL Susceptible             Lab Results: personally reviewed.       ASSESSMENT/PLAN:  POD #7 after Robotic-assisted laparoscopic radical cystoprostatectomy, Bilateral pelvic lymphadenectomy, Ileal conduit urinary diversion by Dr. Lynch for urothelial cell carcinoma      - Continue ciprofloxacin to complete 10-14 day course. For pyelonephritis    - Bowels: Continue naloxegol and senna-docusate. Tolerating regular diet  - Fluid creatinine consistent with serum on 10/24.   - Creatinine improving, 1.48 today (baseline 1.42-1.48).   - Planning to discharge with subcutaneous lovenox, RN instructing patient.   - Appreciate [facility] RN assistance with urostomy management/teaching. Anticipate will need [facility] for continue ostomy teaching/management.   - Appreciate Hospitalist's assistance with medical management.     Discussed with:  LINDA ChahalC  [facility]   [phone]

## 2024-10-30 NOTE — PROGRESS NOTES
"SPIRITUAL HEALTH SERVICES NOTE  [facility]; P2    Reason for Visit: LOS  Plan of Care: No further plans for follow-up at this time, but will remain available for further support as patient/family needs/desires.    SPIRITUAL ASSESSMENT  In decent spirits; adjusting to a difficult diagnosis  Reports good support from family and community  Hopeful - feels good about his care team/confident in their decisions  No signifcant spiritual beliefs/practices identified  Enjoys hunting/walking in the woods    FULL SPIRITUAL CARE NOTE:   Met with Lobito and his wife Amy due to LOS. Lobito says that he had a few rough days due to pain, but that his pain is managed now. He is hopeful that he will be able to go home in the next few days. Lobito shares that he was diagnosed in April and that he has had some time to adjust to his diagnosis. He says \"I struggled a bit at first, but  I'm starting to see that there can be life after this... I feel like I can see the light at the end of the tunnel.\" I affirmed his ability to adjust and see beyond his immediate circumstances. Lobito identifies support from his wife and children who live nearby. He says \"All I have to do is call, and I will have a houseful of family and friends willing to help.\" Lobito enjoys hunting and is looking forward to being back at it again next year. He notes \"I don't want to push myself too far and then wind up back here.\" He denies any meaningful spiritual beliefs and is not connected to any Mosque/parish at this time. He and Amy deny any concerns about discharge.     Time Spent with Patient/Family: 20 minutes    Reyna Baez M.Div.    Office: [phone] (for non-urgent requests)  Please Vocera or page through [facility] for time-sensitive requests    "

## 2024-10-30 NOTE — PLAN OF CARE
Problem: Adult Inpatient Plan of Care  Goal: Plan of Care Review  Outcome: Progressing  Flowsheets (Taken 10/30/2024 1529)  Outcome Evaluation: Vitals stable this shift, working on pain mgmt, pt having loose stools  Plan of Care Reviewed With: patient  Overall Patient Progress: improving     Problem: Surgery Nonspecified  Goal: Effective Bowel Elimination  Outcome: Progressing     Problem: Infection  Goal: Absence of Infection Signs and Symptoms  Outcome: Progressing          NURSING PROGRESS NOTE       Shift Summary: Patient VSS on RA, A+Ox4, able to make needs known. Endorsing incisional pain 5-6/10, managed w/ admin of prn medications and interventions per patient.       Pertinent Shift Updates:  High output from KAVIN drain, 350 ml total this shift. Erythema noted at staple sites/incisions, no drainage noted, patient afebrile. Passing loose stools.         Plan:  Continue w/ pain mgmt, abx, and monitoring output.       Fred Vera RN 10/30/2024  [id number]     For detailed vital signs and assessments, please see documentation flowsheets. For detailed medication administrations, please see MAR.

## 2024-10-30 NOTE — PLAN OF CARE
Problem: Surgery Nonspecified  Goal: Absence of Bleeding  Outcome: Progressing  Goal: Absence of Infection Signs and Symptoms  Outcome: Progressing  Goal: Anesthesia/Sedation Recovery  Intervention: Optimize Anesthesia Recovery  Recent Flowsheet Documentation  Taken 10/29/2024 1657 by Shobha Archer RN  Safety Promotion/Fall Prevention:   activity supervised   clutter free environment maintained   nonskid shoes/slippers when out of bed   safety round/check completed  Level Incentive Spirometer (mL): 2500     Problem: Pain Acute  Goal: Optimal Pain Control and Function  Outcome: Progressing  Intervention: Prevent or Manage Pain  Recent Flowsheet Documentation  Taken 10/29/2024 1657 by Shobha Archer RN  Medication Review/Management: medications reviewed   Goal Outcome Evaluation:         Patient rates pain to abdomen 6-10 despite PRN Dilaudid 3mg PO. Ambulates in baker independently. Lap sites CDI. KAVIN site pink, intact, dressing changed. KAVIN output 255mL this shift. Urostomy intact, output 850mL. VSS. Continues to receive IV Cipro.

## 2024-10-30 NOTE — PROGRESS NOTES
Care Management Follow Up     Length of Stay (days): 8     Expected Discharge Date: 10/31/2024     Anticipated Discharge Plan:  home with home care (RN, PT, OT)     Transportation: Confirmed Family/friend     PT Recommendations: home with assist  OT Recommendations:  home with assist, home with home care occupational therapy     Barriers to Discharge: medical stability     Prior Living Situation: house with spouse     Discussed  Partnership in Safe Discharge Planning  document with patient/family: NA      Handoff Completed: No, handoff not indicated or clinically appropriate     Patient/Spokesperson Updated: No     Additional Information:  SW reviewed chart. ELYSSA spoke with Katalina in intake at [facility] (ph: [phone]). Katalina requests update related to when pt is ready for discharge. Katalina confirmed home care orders were obtained through [facility] clinic. Katalina requests discharge summary when patient leaves.      Next Steps: update Katalina in intake at [facility] when pt is ready for discharge, send discharge paperwork to home care agency.       DAVID Lozano at 2:16 PM on 10/30/24

## 2024-10-30 NOTE — PLAN OF CARE
Problem: Adult Inpatient Plan of Care  Goal: Optimal Comfort and Wellbeing  Outcome: Progressing     Problem: Surgery Nonspecified  Goal: Absence of Infection Signs and Symptoms  Outcome: Progressing     Problem: Surgery Nonspecified  Goal: Optimal Pain Control and Function  Outcome: Progressing   Goal Outcome Evaluation:      Plan of Care Reviewed With: patient    Overall Patient Progress: improving       Pt asleep most of the night.  A&O x4.  Denies pain.  Independent. Ambulated in baker.  Plan of care ongoing.

## 2024-10-30 NOTE — PROGRESS NOTES
[facility]    Medicine Progress Note - Hospitalist Service    Date of Admission:  10/22/2024    Assessment & Plan   69-year-old male with bladder cancer admitted for surgery.  Postop course complicated by ileus, pyelonephritis and VAMSHI.    #Muscle invasive bladder cancer, urothelial cell  S/p robotic assisted laparoscopic cystoprostatectomy with bilateral pelvic lymph node dissection and extracorporeal ileal conduit urinary diversion 10/22  Urology primary  Pain control    #R pyelonephritis  Evident on CT 10/25pm  UCx grew pansensitive Pseudomonas aeruginosa  Zosyn (x2 days) switched to Cipro, day #4 (total #6 day of abx), Urology wants 10-14 day course, plan for PO on DC    #Postop ileus  Having flatus and small BMs  Suppository HS, Senna-colace BID, add MoM  Advanced to regular diet    #VAMSHI on CKD stage II, nonoliguric  #Anion gap metabolic acidosis, improving  S/p IV fluids  Cr peaked 1.68, now trending down, monitor  Good urine output, not polyuric  Received contrast 10/25  Avoid nephrotoxic agents and hypotension    #Elevated blood pressure, improved  Likely due to pain, no prior history of hypertension  As needed IV hydralazine     #History of diabetes type 2  A1c 5.3  Holding metformin  Stopped glucose checks     #KRYS  CPAP      VTE ppx: sc Heparin          Diet: Regular Diet Adult  Snacks/Supplements Adult: Ensure Enlive; Between Meals    Lombardo Catheter: Not present  Lines: PRESENT      Port a Cath 10/28/24 Single Lumen Right Chest wall-Site Assessment: WDL      Cardiac Monitoring: None  Code Status: Full Code      Clinically Significant Risk Factors         # Hyponatremia: Lowest Na = 133 mmol/L in last 2 days, will monitor as appropriate       # Hypoalbuminemia: Lowest albumin = 3.2 g/dL at 10/29/2024  6:13 AM, will monitor as appropriate               # Obesity: Estimated body mass index is 30.85 kg/m  as calculated from the following:    Height as of this encounter: 1.753 m (5'  "9\").    Weight as of this encounter: 94.8 kg (208 lb 14.4 oz).   # Severe Malnutrition: based on nutrition assessment    # Financial/Environmental Concerns: none         Disposition Plan     Medically Ready for Discharge: Anticipated Tomorrow             Carter Mendoza DO  Hospitalist Service  [facility]  Securely message with Shaka (more info)  Text page via [employer] Paging/Directory   ______________________________________________________________________    Interval History   Had loose BM this AM    Physical Exam   Vital Signs: Temp: 98.2  F (36.8  C) Temp src: Oral BP: 131/73 Pulse: 73   Resp: 16 SpO2: 98 % O2 Device: None (Room air)    Weight: 208 lbs 14.4 oz    General Appearance:  No acute distress  Respiratory: Clear to auscultation bilaterally  Cardiovascular: Regular rate and rhythm  GI: Normal bowel sounds, abdomen is soft with no rebound    Medical Decision Making             Data     "

## 2024-10-31 VITALS
RESPIRATION RATE: 18 BRPM | WEIGHT: 208.9 LBS | DIASTOLIC BLOOD PRESSURE: 81 MMHG | SYSTOLIC BLOOD PRESSURE: 130 MMHG | OXYGEN SATURATION: 98 % | TEMPERATURE: 98.2 F | HEART RATE: 77 BPM | BODY MASS INDEX: 30.94 KG/M2 | HEIGHT: 69 IN

## 2024-10-31 LAB
ANION GAP SERPL CALCULATED.3IONS-SCNC: 9 MMOL/L (ref 7–15)
BASOPHILS # BLD AUTO: 0.1 10E3/UL (ref 0–0.2)
BASOPHILS NFR BLD AUTO: 1 %
BUN SERPL-MCNC: 22 MG/DL (ref 8–23)
CALCIUM SERPL-MCNC: 9 MG/DL (ref 8.8–10.4)
CHLORIDE SERPL-SCNC: 100 MMOL/L (ref 98–107)
CREAT FLD-MCNC: 1.5 MG/DL
CREAT SERPL-MCNC: 1.43 MG/DL (ref 0.67–1.17)
CREATININE BODY FLUID SOURCE: NORMAL
EGFRCR SERPLBLD CKD-EPI 2021: 54 ML/MIN/1.73M2
EOSINOPHIL # BLD AUTO: 0.4 10E3/UL (ref 0–0.7)
EOSINOPHIL NFR BLD AUTO: 3 %
ERYTHROCYTE [DISTWIDTH] IN BLOOD BY AUTOMATED COUNT: 15.2 % (ref 10–15)
GLUCOSE SERPL-MCNC: 120 MG/DL (ref 70–99)
HCO3 SERPL-SCNC: 27 MMOL/L (ref 22–29)
HCT VFR BLD AUTO: 35.4 % (ref 40–53)
HGB BLD-MCNC: 11.4 G/DL (ref 13.3–17.7)
IMM GRANULOCYTES # BLD: 0.5 10E3/UL
IMM GRANULOCYTES NFR BLD: 5 %
LYMPHOCYTES # BLD AUTO: 2.2 10E3/UL (ref 0.8–5.3)
LYMPHOCYTES NFR BLD AUTO: 19 %
MCH RBC QN AUTO: 29 PG (ref 26.5–33)
MCHC RBC AUTO-ENTMCNC: 32.2 G/DL (ref 31.5–36.5)
MCV RBC AUTO: 90 FL (ref 78–100)
MONOCYTES # BLD AUTO: 1.3 10E3/UL (ref 0–1.3)
MONOCYTES NFR BLD AUTO: 11 %
NEUTROPHILS # BLD AUTO: 7 10E3/UL (ref 1.6–8.3)
NEUTROPHILS NFR BLD AUTO: 61 %
NRBC # BLD AUTO: 0 10E3/UL
NRBC BLD AUTO-RTO: 0 /100
PLATELET # BLD AUTO: 262 10E3/UL (ref 150–450)
POTASSIUM SERPL-SCNC: 4.5 MMOL/L (ref 3.4–5.3)
RBC # BLD AUTO: 3.93 10E6/UL (ref 4.4–5.9)
SODIUM SERPL-SCNC: 136 MMOL/L (ref 135–145)
WBC # BLD AUTO: 11.5 10E3/UL (ref 4–11)

## 2024-10-31 PROCEDURE — 250N000009 HC RX 250: Performed by: STUDENT IN AN ORGANIZED HEALTH CARE EDUCATION/TRAINING PROGRAM

## 2024-10-31 PROCEDURE — 250N000013 HC RX MED GY IP 250 OP 250 PS 637: Performed by: STUDENT IN AN ORGANIZED HEALTH CARE EDUCATION/TRAINING PROGRAM

## 2024-10-31 PROCEDURE — 250N000013 HC RX MED GY IP 250 OP 250 PS 637: Performed by: HOSPITALIST

## 2024-10-31 PROCEDURE — 250N000011 HC RX IP 250 OP 636: Performed by: HOSPITALIST

## 2024-10-31 PROCEDURE — 80048 BASIC METABOLIC PNL TOTAL CA: CPT | Performed by: HOSPITALIST

## 2024-10-31 PROCEDURE — 82947 ASSAY GLUCOSE BLOOD QUANT: CPT | Performed by: HOSPITALIST

## 2024-10-31 PROCEDURE — 85004 AUTOMATED DIFF WBC COUNT: CPT | Performed by: HOSPITALIST

## 2024-10-31 RX ORDER — OXYCODONE HYDROCHLORIDE 5 MG/1
5 TABLET ORAL EVERY 6 HOURS PRN
Qty: 12 TABLET | Refills: 0 | Status: SHIPPED | OUTPATIENT
Start: 2024-10-31 | End: 2024-10-31

## 2024-10-31 RX ORDER — CIPROFLOXACIN 500 MG/1
500 TABLET, FILM COATED ORAL 2 TIMES DAILY
Qty: 14 TABLET | Refills: 0 | Status: SHIPPED | OUTPATIENT
Start: 2024-10-31

## 2024-10-31 RX ORDER — OXYCODONE HYDROCHLORIDE 5 MG/1
5 TABLET ORAL EVERY 6 HOURS PRN
Qty: 12 TABLET | Refills: 0 | Status: SHIPPED | OUTPATIENT
Start: 2024-10-31

## 2024-10-31 RX ORDER — DOCUSATE SODIUM 100 MG/1
100 CAPSULE, LIQUID FILLED ORAL 2 TIMES DAILY PRN
Qty: 14 CAPSULE | Refills: 0 | Status: SHIPPED | OUTPATIENT
Start: 2024-10-31 | End: 2024-10-31

## 2024-10-31 RX ORDER — ENOXAPARIN SODIUM 100 MG/ML
40 INJECTION SUBCUTANEOUS DAILY
Qty: 10 ML | Refills: 0 | Status: SHIPPED | OUTPATIENT
Start: 2024-10-31

## 2024-10-31 RX ORDER — CIPROFLOXACIN 500 MG/1
500 TABLET, FILM COATED ORAL 2 TIMES DAILY
Qty: 16 TABLET | Refills: 0 | Status: CANCELLED | OUTPATIENT
Start: 2024-10-31 | End: 2024-11-08

## 2024-10-31 RX ORDER — DOCUSATE SODIUM 100 MG/1
100 CAPSULE, LIQUID FILLED ORAL 2 TIMES DAILY PRN
Qty: 14 CAPSULE | Refills: 0 | Status: SHIPPED | OUTPATIENT
Start: 2024-10-31

## 2024-10-31 RX ORDER — CIPROFLOXACIN 500 MG/1
500 TABLET, FILM COATED ORAL 2 TIMES DAILY
Qty: 14 TABLET | Refills: 0 | Status: SHIPPED | OUTPATIENT
Start: 2024-10-31 | End: 2024-10-31

## 2024-10-31 RX ORDER — ENOXAPARIN SODIUM 100 MG/ML
40 INJECTION SUBCUTANEOUS DAILY
Qty: 10 ML | Refills: 0 | Status: SHIPPED | OUTPATIENT
Start: 2024-10-31 | End: 2024-10-31

## 2024-10-31 RX ADMIN — HEPARIN SODIUM 5000 UNITS: 10000 INJECTION, SOLUTION INTRAVENOUS; SUBCUTANEOUS at 09:20

## 2024-10-31 RX ADMIN — NALOXEGOL OXALATE 12.5 MG: 12.5 TABLET, FILM COATED ORAL at 05:57

## 2024-10-31 RX ADMIN — PANTOPRAZOLE SODIUM 40 MG: 40 TABLET, DELAYED RELEASE ORAL at 05:57

## 2024-10-31 RX ADMIN — ANTICOAGULANT CITRATE DEXTROSE SOLUTION FORMULA A 5 ML: 12.25; 11; 3.65 SOLUTION INTRAVENOUS at 17:59

## 2024-10-31 RX ADMIN — HYDROMORPHONE HYDROCHLORIDE 2 MG: 2 TABLET ORAL at 09:38

## 2024-10-31 RX ADMIN — HYDROMORPHONE HYDROCHLORIDE 3 MG: 2 TABLET ORAL at 18:06

## 2024-10-31 RX ADMIN — CIPROFLOXACIN 500 MG: 500 TABLET ORAL at 09:20

## 2024-10-31 ASSESSMENT — ACTIVITIES OF DAILY LIVING (ADL)
ADLS_ACUITY_SCORE: 0

## 2024-10-31 NOTE — PLAN OF CARE
Problem: Adult Inpatient Plan of Care  Goal: Optimal Comfort and Wellbeing  Intervention: Monitor Pain and Promote Comfort  Recent Flowsheet Documentation  Taken 10/31/2024 0120 by Roxanna Granados RN  Pain Management Interventions: medication (see MAR)   Goal Outcome Evaluation:         Pt A/O x4.VSS. Denies pain. Pt resting comfortably in bed.

## 2024-10-31 NOTE — PLAN OF CARE
Patient is alert and oriented on RA. Patient endorses pain of 7/10, and given PRN Dilaudid 3 mg with relief. Patient has a drain output of 135 this shift. Patient had two bowel movements and voided through urostomy.    Deb Szymanski RN      Problem: Adult Inpatient Plan of Care  Goal: Plan of Care Review  Description: The Plan of Care Review/Shift note should be completed every shift.  The Outcome Evaluation is a brief statement about your assessment that the patient is improving, declining, or no change.  This information will be displayed automatically on your shift  note.  Outcome: Progressing   Goal Outcome Evaluation:

## 2024-10-31 NOTE — PROGRESS NOTES
CLINICAL NUTRITION SERVICES - BRIEF NOTE    EVALUATION OF THE PROGRESS TOWARD GOALS   Diet: Regular  Nutrition Supplement: Ensure enlive TID   Intake/Tolerance: Great     10/29 75% breakfast, 100% lunch   10/30 100% breakfast   + 100% ensure enlive x2-3/day      NEW FINDINGS   Met with pt at bedside this AM. Pt reports good po intakes and appetite. Pt notes eating well, taking ensure enlive when delivered. Pt denies any nausea vomiting or abd pain. Pt agreeable to continue nutrition supplements. Pt with no nutrition questions or concerns at this time.     Per chart review  Rounded abd  Last BM x3 10/30   Abd drain, OP: 595 ml 10/30, 240 ml this AM  I/Os: -20.19 L, incomplete   Urostomy   Incision/surgical sites   Edema: Trace L-hand, ankles   Most Recent Weight: 94.8 kg (208 lb 14.4 oz)    Weight History: 7.3% wt loss x1 week, 10% wt loss x5 months   10/29/24 0739 94.8 kg (208 lb 14.4 oz) Standing scale   10/28/24 0850 96 kg (211 lb 9.6 oz) Standing scale   10/26/24 1451 96.4 kg (212 lb 8 oz) Standing scale   10/25/24 0757 97.8 kg (215 lb 8 oz) Standing scale   10/24/24 1219 100.4 kg (221 lb 5.5 oz) Bed scale   10/23/24 1025 102.3 kg (225 lb 8 oz) Standing scale      LABS  Labs reviewed  Creat 1.43(H)       MEDICATIONS  Medications reviewed  Scheduled dulcolax, cipro, milk of magnesia, movantik, protonix, crestor, senna    MALNUTRITION:  Malnutrition Diagnosis: Severe malnutrition  In Context of:  Acute illness or injury - Improved     INTERVENTIONS  Implementation  Medical food supplement therapy- Decrease, Ensure enlive BID   Emphasized adequate po intakes to meet nutrition needs, supplements     Goals  Pt to meet >75% nutritional needs - Met   Maintain weight - No new weight since 10/29   Electrolytes WNL - Met   BG < 180 - Met     Monitoring/Evaluation  Progress toward goals will be monitored and evaluated per protocol.

## 2024-10-31 NOTE — PROGRESS NOTES
"  [facility] - POSTOP PROGRESS NOTE    PLACE OF SERVICE:  [facility]     SURGERY: POD #9 after Robotic-assisted laparoscopic radical cystoprostatectomy, Bilateral pelvic lymphadenectomy, Ileal conduit urinary diversion by Dr. Lynch for urothelial cell carcinoma      SUBJECTIVE:   Events: no acute events overnight. Pt reports feeling better today. Passed gas and ate breakfast today. Pain is improving. Denies fever, chills, N/V, flank pain.     Spouse present in room.     OBJECTIVE:  PHYSICAL EXAM  Vital signs:  Temp: 98.2  F (36.8  C) Temp src: Oral BP: 130/81 Pulse: 77   Resp: 18 SpO2: 98 % O2 Device: None (Room air) Oxygen Delivery: 1.5 LPM Height: 175.3 cm (5' 9\") Weight: 94.8 kg (208 lb 14.4 oz)  Estimated body mass index is 30.85 kg/m  as calculated from the following:    Height as of this encounter: 1.753 m (5' 9\").    Weight as of this encounter: 94.8 kg (208 lb 14.4 oz).    General: NAD, alert, cooperative  Neuro: A&O x 3. Moving all extremities equally.   Resp: reg resp rate/depth.   Abdomen: Soft, mildly distended and appropriately tender abdomen.  Urostomy stoma is pink in color, ureterostomy tube tips x 2 noted extending from stoma. Urostomy draining yellow urine with some sediment. KAVIN draining serous fluid  Incisions: C/D/I, closed with staples.    LE: CWMS, no bilateral LE edema.     LABS:  No results found for: \"INR\"   Lab Results   Component Value Date    WBC 9.7 10/29/2024     Lab Results   Component Value Date    RBC 4.03 10/29/2024     Lab Results   Component Value Date    HGB 11.9 10/29/2024     Lab Results   Component Value Date    HCT 36.3 10/29/2024     Lab Results   Component Value Date    MCV 90 10/29/2024     Lab Results   Component Value Date    MCH 29.5 10/29/2024     Lab Results   Component Value Date    MCHC 32.8 10/29/2024     Lab Results   Component Value Date    RDW 15.4 10/29/2024     Lab Results   Component Value Date     10/29/2024     Creatinine   Date " Value Ref Range Status   10/31/2024 1.43 (H) 0.67 - 1.17 mg/dL Final     Sodium   Date Value Ref Range Status   10/31/2024 136 135 - 145 mmol/L Final     Potassium   Date Value Ref Range Status   10/31/2024 4.5 3.4 - 5.3 mmol/L Final     Magnesium   Date Value Ref Range Status   10/23/2024 2.3 1.7 - 2.3 mg/dL Final     UC: 50-100k pseudomonas aeruginosa  Susceptibility       Pseudomonas aeruginosa     AALIYAH     Amikacin <=2 ug/mL Susceptible     Cefepime 2 ug/mL Susceptible     Ceftazidime 4 ug/mL Susceptible     Ciprofloxacin <=0.25 ug/mL Susceptible     Levofloxacin 0.5 ug/mL Susceptible     Meropenem <=0.25 ug/mL Susceptible     Piperacillin/Tazobactam 8 ug/mL Susceptible     Tobramycin <=1 ug/mL Susceptible             Lab Results: personally reviewed.       ASSESSMENT/PLAN:  POD #9 after Robotic-assisted laparoscopic radical cystoprostatectomy, Bilateral pelvic lymphadenectomy, Ileal conduit urinary diversion by Dr. Lynch for urothelial cell carcinoma      - Continue ciprofloxacin to complete 10-14 day course for pyelonephritis    - With return of bowel function and tolerating PO diet  - Repeat KAVIN creatinine pending. If normal, then will remove KAVIN drain today.  - Nursing to flush stents per order once today.  - Creatinine improving, 1.43 today (baseline 1.42-1.48).   - Planning to discharge with subcutaneous lovenox, RN instructing patient.   - Appreciate [facility] RN assistance with urostomy management/teaching. Anticipate will need [facility] for continue ostomy teaching/management.   - Appreciate Hospitalist's assistance with medical management.   - Possible discharge later today.    Discussed with:  Dr. Syed Pena PA-C  [facility]   [phone]    Addendum:  Patient medically ready for discharge. Discussed with Dr. Lynch. Machinery at [place] lab broken to run repeat KAVIN creatinine. Ok to remove KAVIN drain as initial KAVIN creatinine was WNL. Hospitalist team aware and in agreement of  discharge plan.   -Nurse to remove KAVIN drain and teach lovenox injections  -Will place discharge orders for when the above cares have been completed     Danielle Pena PA-C on 10/31/2024 at 3:44 PM

## 2024-10-31 NOTE — PLAN OF CARE
NURSING PROGRESS NOTE       Shift Summary: Patient vitally stable on RA, A+Ox4, able to make needs known. Endorsing incisional pain 5-6/10, managed adequately w/ admin of prn Dilaudid per pt. Independent in room.       Pertinent Shift Updates:  Discharge orders placed, KAVIN drain removed per orders. Writer went over AVS w/ pt and wife at bedside. Walked through urostomy pouch change steps, pt declined performing actual bag change and stated he feels comfortable changing once he gets home. Provided education on Lovenox admin and sent sharps container with pt at discharge. Sent with urostomy pouch change supplies to last until follow up.     Notified on-call Dr Navas that pt requested something aside from oxycodone at discharge as pt reacted poorly to oxycodone in the past, new prescription will be called to pt's preferred pharmacy per Dr. Navas for PO dilaudid.     Pt transported home with wife at time of discharge.         Plan:  Follow up with [facility] clinic on 11/12, follow up with PCP and Dr. Lynch as directed.       Fred Vera RN 10/31/2024  [id number]     For detailed vital signs and assessments, please see documentation flowsheets. For detailed medication administrations, please see MAR.

## 2024-10-31 NOTE — DISCHARGE SUMMARY
[facility] DISCHARGE SUMMARY    Name: Lobito Zheng  : 1955  MRN: [id number]  PCP: Glenis Peña    Place of service: [facility]    Admission date: 10/22/2024   Discharge date: 10/31/24     Surgeon:  Dr. Lynch      Surgical Procedure: Robotic-assisted laparoscopic radical cystoprostatectomy  Bilateral pelvic lymphadenectomy  Ileal conduit urinary diversion    Date of surgery: 10/22/24    Principal Diagnosis at Discharge:   Malignant neoplasm of urinary bladder (H) [C67.9]     Other diagnosis addressed during hospitalization:  None    Consults:  Hospitalist    Diagnostic Studies :  None    Complications while in the Hospital:  R pyelonephritis, postop ileus     Physical Exam:  Temp: 98.2  F (36.8  C) Temp src: Oral BP: 130/81 Pulse: 77   Resp: 18 SpO2: 98 % O2 Device: None (Room air)      Gen: nad, alert  Neuro: A&O x 3. Moving all extremities equally.   Resp: Reg resp rate/depth.   Abdomen: appropriately tender, mildly distended, no rebound or peritoneal signs. Urostomy stoma pink in color, draining yellow urine with minimal sediment  Incisions: C/D/I, closed with staples, no ecchymosis noted   KAVIN: 725 cc output in 24 hours, serous in appearance   LE: CWMS intact.    Brief history of hospital course:  This is a 69 year old male admitted for Malignant neoplasm of urinary bladder (H) [C67.9]. Patient underwent above procedure. Patient tolerated the procedure well. Post operative course was remarkable for R pyelonephritis, postop ileus . By the day of discharge, the patient was ambulatory, tolerating diet, pain was controlled with oral analgesics, labs and vitals stable.     Labs:  Hemoglobin   Date Value Ref Range Status   10/31/2024 11.4 (L) 13.3 - 17.7 g/dL Final   ]  Platelet Count   Date Value Ref Range Status   10/31/2024 262 150 - 450 10e3/uL Final     WBC Count   Date Value Ref Range Status   10/31/2024 11.5 (H) 4.0 - 11.0 10e3/uL Final     Creatinine   Date Value Ref Range Status  "  10/31/2024 1.43 (H) 0.67 - 1.17 mg/dL Final     Potassium   Date Value Ref Range Status   10/31/2024 4.5 3.4 - 5.3 mmol/L Final     No results found for: \"INR\"       DISPOSITION: Home    DISCHARGE CONDITION: Good/Stable    DISCHARGE MEDICATIONS:      Review of your medicines        START taking        Dose / Directions   ciprofloxacin 500 MG tablet  Commonly known as: CIPRO  Used for: Attention to urostomy (H) [Z43.6]      Dose: 500 mg  Take 1 tablet (500 mg) by mouth 2 times daily.  Quantity: 14 tablet  Refills: 0     docusate sodium 100 MG capsule  Commonly known as: COLACE  Used for: Attention to urostomy (H) [Z43.6]      Dose: 100 mg  Take 1 capsule (100 mg) by mouth 2 times daily as needed for constipation.  Quantity: 14 capsule  Refills: 0     * enoxaparin ANTICOAGULANT 40 MG/0.4ML syringe  Commonly known as: LOVENOX  Used for: Deep vein thrombosis (DVT) prophylaxis prescribed at discharge      Dose: 40 mg  Inject 0.4 mLs (40 mg) subcutaneously daily.  Quantity: 10 mL  Refills: 0     * enoxaparin ANTICOAGULANT 40 MG/0.4ML syringe  Commonly known as: LOVENOX  Used for: Deep vein thrombosis (DVT) prophylaxis prescribed at discharge      Dose: 40 mg  Inject 0.4 mLs (40 mg) subcutaneously daily.  Quantity: 10 mL  Refills: 0     oxyCODONE 5 MG tablet  Commonly known as: ROXICODONE  Used for: Attention to urostomy (H) [Z43.6]      Dose: 5 mg  Take 1 tablet (5 mg) by mouth every 6 hours as needed for moderate to severe pain.  Quantity: 12 tablet  Refills: 0           * This list has 2 medication(s) that are the same as other medications prescribed for you. Read the directions carefully, and ask your doctor or other care provider to review them with you.                CONTINUE these medicines which have NOT CHANGED        Dose / Directions   acetaminophen 500 MG tablet  Commonly known as: TYLENOL      Dose: 1,000 mg  Take 1,000 mg by mouth every 6 hours as needed for mild pain.  Refills: 0     Cranberry 500 MG " Tabs      Dose: 500 mg  Take 500 mg by mouth daily.  Refills: 0     Melatonin 12 MG Tabs      Dose: 12 mg  Take 12 mg by mouth at bedtime.  Refills: 0     metFORMIN 1000 MG tablet  Commonly known as: GLUCOPHAGE      Dose: 1,000 mg  Take 1,000 mg by mouth 2 times daily (with meals).  Refills: 0     omeprazole 20 MG DR capsule  Commonly known as: PriLOSEC      Dose: 20 mg  Take 20 mg by mouth daily.  Refills: 0     rosuvastatin 40 MG tablet  Commonly known as: CRESTOR      Dose: 40 mg  Take 40 mg by mouth at bedtime.  Refills: 0     tamsulosin 0.4 MG capsule  Commonly known as: FLOMAX      Dose: 0.8 mg  Take 0.8 mg by mouth daily.  Refills: 0            STOP taking      amoxicillin 500 MG capsule  Commonly known as: AMOXIL                  Where to get your medicines        These medications were sent to [facility] [address] [address]      Phone: [phone]   enoxaparin ANTICOAGULANT 40 MG/0.4ML syringe       These medications were sent to [facility]. [address], [facility] [address]      Phone: [phone]   ciprofloxacin 500 MG tablet  docusate sodium 100 MG capsule  enoxaparin ANTICOAGULANT 40 MG/0.4ML syringe  oxyCODONE 5 MG tablet         DISCHARGE PLAN:   - Follow up with  Dr. Lynch  as scheduled prior to your procedure   - follow up with Glenis Peña as directed   - Take medication as prescribed, avoid anticoagulants per surgeon and PCP recommendations.   - Wound / drain care: As directed prior to discharge  - Physical activity: As tolerated, no heavy lifting. No driving while taking narcotics.   - Diet: regular  - Medication: please review discharge Med req/AVS  - Warning signs discussed with patient about when to call the clinic/hospital  - All questions and concerns were answered for the patient prior to discharge  - Patient  verbalized understanding.     Discussed patient with  Dr. Lynch  on day of discharge.     Danielle Pena PA-C  [facility]   [phone]     I saw the patient on the date of discharge  Total time spent for discharge on date of discharge: 20 minutes    Physician(s) in addition to primary physician who should receive a copy:  CC1: Glenis Peña           ?

## 2024-11-01 NOTE — PROGRESS NOTES
Care Management Discharge Note    Discharge Date: 10/31/2024       Discharge Disposition: Home Care    Discharge Services: None    Discharge DME: None    Discharge Transportation: family or friend will provide    Private pay costs discussed: Not applicable    Does the patient's insurance plan have a 3 day qualifying hospital stay waiver?  No    PAS Confirmation Code:    Patient/family educated on Medicare website which has current facility and service quality ratings:      Education Provided on the Discharge Plan:  per treatment team   Persons Notified of Discharge Plans: patient   Patient/Family in Agreement with the Plan:  yes    Handoff Referral Completed: No, handoff not indicated or clinically appropriate    Additional Information:  Patient discharged to home 10/31. ELYSSA called Katalina in intake at [facility] (ph: [phone]) to notify her of discharge. Home care orders obtained through [facility]. ELYSSA faxed AVS to Katalina.     DAVDI Lozano at 7:48 AM on 11/1/2024

## 2024-11-07 NOTE — PROGRESS NOTES
The final diagnosis is adenocarcinoma of the prostate and invasive high-grade urothelial carcinoma of the bladder.     Danielle Pena PA-C on 11/7/2024 at 2:35 PM

## 2024-11-12 ENCOUNTER — HOSPITAL ENCOUNTER (OUTPATIENT)
Dept: WOUND CARE | Facility: HOSPITAL | Age: 69
Discharge: HOME | End: 2024-11-12
Admitting: STUDENT IN AN ORGANIZED HEALTH CARE EDUCATION/TRAINING PROGRAM

## 2024-11-12 DIAGNOSIS — Z43.6 ATTENTION TO UROSTOMY (H): Primary | ICD-10-CM

## 2024-11-12 PROCEDURE — G0463 HOSPITAL OUTPT CLINIC VISIT: HCPCS

## 2024-11-12 NOTE — DISCHARGE INSTRUCTIONS
"[facility]  OUTPATIENT OSTOMY ASSESSMENT    INTAKE  Type of Stoma: Permanent Urostomy  Anticipated date of takedown: n/a    Diagnosis Pertinent to Stoma: Cancer - Bladder   Type of Surgery: LAR and APR     Sur[facility]: [facility]    Purpose of this visit:  initial post op    Pertinent Information: n/a    Present for Teaching Session: Patient and Spouse   Present: NA      Current Equipment:    Product # Brand Description   Pouch 46501 Carmen 2 pc Urostomy   Skin Barrier (Flange) 88689 Carmen ctf flat   Ring/Paste 8805 Carmen Adapt     Powder 7906 Brant Adapt   Liquid Skin Barrier 3344 3M Cavilon No Sting   optional [id number] Brant Precut 1\" flange   optional 36035 Carmen Pouch for precut flange     Pouch Change Frequency: 3-4 days  Provider of Care: Emptying: self Pouch Change: spouse    ASSESSMENT  Stoma Size: Round 1 inches  Protrusion:  0.5 inches  Stoma Appearance: Red and Moist  Mucocutaneous Juncture: Intact and Sutures Present   Peristomal Skin: Intact and Erythema  Abdominal Assessment:     INTERVENTIONS  Refitting done and Educated on peristomal skin treatment    INSTRUCTIONS GIVEN      PLAN  Continue same Pouching System      Total Time Spent with Patient: 30 minutes    "

## 2024-11-12 NOTE — PROGRESS NOTES
"[facility]  OUTPATIENT OSTOMY ASSESSMENT    INTAKE  Type of Stoma: Permanent Urostomy  Anticipated date of takedown: n/a    Diagnosis Pertinent to Stoma: Cancer - Bladder   Type of Surgery: LAR and APR     Surgeon:[facility]: [facility]    Purpose of this visit: initial post op    Pertinent Information: n/a    Present for Teaching Session: Patient and Spouse   Present: NA      Current Equipment:    Product # Brand Description   Pouch [id number] Carmen 2 pc Urostomy   Skin Barrier (Flange) 89055 Carmen ctf flat   Ring/Paste 8805 Carmen Adapt     Powder 7906 Carmen Adapt   Liquid Skin Barrier 3344 3M Cavilon No Sting   optional 74551 Carmen Precut 1\" flange   optional 46925 Carmen Pouch for precut flange     Pouch Change Frequency: 3-4 days  Provider of Care: Emptying: self Pouch Change: spouse    ASSESSMENT  Stoma Size: Round 1 inches  Protrusion: 0.5 inches  Stoma Appearance: Red and Moist  Mucocutaneous Juncture: Intact and Sutures Present   Peristomal Skin: Intact and Erythema  Abdominal Assessment:             INTERVENTIONS  Refitting done and Educated on peristomal skin treatment    INSTRUCTIONS GIVEN      PLAN  Continue same Pouching System      Total Time Spent with Patient: 30 minutes  "

## (undated) DEVICE — ESU PENCIL SMOKE EVAC W/ROCKER SWITCH 0703-047-000

## (undated) DEVICE — ANTIFOG SOLUTION SEE SHARP 150M TROCAR SWABS 30978 (COI)

## (undated) DEVICE — SUCTION STRYKERFLOW II 250-070-500

## (undated) DEVICE — SUTURE PDS 0 60IN CTX+ LOOPED PDP990G

## (undated) DEVICE — DRAPE SHEET TABLE COVER KC 42301*

## (undated) DEVICE — SU VICRYL+ 2-0 27 UR-6 VLT VCP602H

## (undated) DEVICE — ENDO TROCAR FIRST ENTRY KII FIOS Z-THRD 05X100MM CTF03

## (undated) DEVICE — ENDO SHEARS RENEW LAP ENDOCUT SCISSOR TIP 16.5MM 3142

## (undated) DEVICE — COUNTER NEEDLE ADH & FOAM 20CT 9106

## (undated) DEVICE — GLOVE BIOGEL INDICATOR 7.5 LF 41675

## (undated) DEVICE — DRSG DRAIN 4X4" 7086

## (undated) DEVICE — SUTURE VICRYL+ 2-0 27IN CT-1 UND VCP259H

## (undated) DEVICE — DRSG PRIMAPORE 04X11 3/4"

## (undated) DEVICE — SU SILK 2-0 SH 30" K833H

## (undated) DEVICE — DRAPE U SPLIT 74X120" 29440

## (undated) DEVICE — BLADE KNIFE SURG 15 371115

## (undated) DEVICE — NDL INSUFFLATION 13GA 120MM C2201

## (undated) DEVICE — DAVINCI XI DRAPE ARM 470015

## (undated) DEVICE — SUCTION TIP YANKAUER W/O VENT K86

## (undated) DEVICE — SUCTION MANIFOLD NEPTUNE 2 SYS 1 PORT 702-025-000

## (undated) DEVICE — CUSTOM PACK DA VINCI SBA5BDVHEA

## (undated) DEVICE — DRAIN RESERVOIR 100ML JP 0070740

## (undated) DEVICE — PROTECTOR ARM STANDARD ONE STEP 40433 (COI)

## (undated) DEVICE — ENDO TROCAR FIRST ENTRY KII FIOS Z-THRD 12X100MM CTF73

## (undated) DEVICE — PREP CHLORAPREP 26ML TINTED HI-LITE ORANGE 930815

## (undated) DEVICE — TROCAR PORT BLADELESS 5X100MM IAS5-100LP

## (undated) DEVICE — CATH FOLEY 18FR 5ML LUBRICATH LATEX 0165L18

## (undated) DEVICE — ESU HOLSTER PLASTIC DISP E2400

## (undated) DEVICE — STPL RELOAD LINEAR CUT 75MM TCR75

## (undated) DEVICE — SU SILK 3-0 SH CR 8X18" C013D

## (undated) DEVICE — DRAPE IOBAN INCISE 23X17" 6650EZ

## (undated) DEVICE — SU SILK 2-0 TIE 18' A185H

## (undated) DEVICE — SU VICRYL 4-0 RB-1 27" J304

## (undated) DEVICE — ENDO POUCH UNIV RETRIEVAL SYSTEM INZII 10MM CD001

## (undated) DEVICE — CLIP ENDO HEMO-LOC PURPLE LG 544240

## (undated) DEVICE — DAVINCI XI SEAL UNIVERSAL 5-12MM 470500

## (undated) DEVICE — GLOVE UNDER INDICATOR PI SZ 6.5 LF 41665

## (undated) DEVICE — SU MONOCRYL+ 4-0 18IN PS2 UND MCP496G

## (undated) DEVICE — OSTOMY POUCH 12IN 2.5- IN CERAMIDE PLS FLT 1 PC CTF 8931

## (undated) DEVICE — STPL LINEAR CUT 60X3.5MM TX60B

## (undated) DEVICE — SU SILK 2-0 FS-1 18" 685G

## (undated) DEVICE — ENDO POUCH UNIVERSAL RETRIEVAL SYSTEM INZII 12/15MM CD004

## (undated) DEVICE — DRSG GAUZE 2X2" 8042

## (undated) DEVICE — SU DERMABOND ADVANCED .7ML DNX12

## (undated) DEVICE — POSITIONING KIT THE PINK PAD XL 40X20X1IN 40583 (COI)

## (undated) DEVICE — HANDPIECE ENSEAL G2 45CM CURVED

## (undated) DEVICE — SYR 50ML SLIP TIP W/O NDL 309654

## (undated) DEVICE — CATH TRAY FOLEY SURESTEP 16FR DRAIN BAG STATOCK A899916

## (undated) DEVICE — SU CLIP LAPRA TY XC200

## (undated) DEVICE — Device

## (undated) DEVICE — DAVINCI XI DRAPE COLUMN 470341

## (undated) DEVICE — PREP DYNA-HEX 4% CHG SCRUB 4OZ BOTTLE MDS098710

## (undated) DEVICE — DRAPE POUCH INSTRUMENT 3 POCKET 1018L

## (undated) DEVICE — SU VICRYL+ 0 27 UR6 VLT VCP603H

## (undated) DEVICE — SUTURE VICRYL+ 2-0 27IN SH UND VCP417H

## (undated) DEVICE — SU PDS II 4-0 RB-1 27" Z304H

## (undated) DEVICE — TUBING SUCTION MEDI-VAC 1/4"X20' N620A

## (undated) DEVICE — SPONGE LAP 18X18" X8435

## (undated) DEVICE — SU CHROMIC 5-0 RB-1 27" U202H

## (undated) DEVICE — SYR BULB IRRIG DOVER 60 ML LATEX FREE 67000

## (undated) DEVICE — ADAPTOR UROSTOMY DRAIN TUBE LF 7331

## (undated) DEVICE — STPL SKIN 35W 6.9MM  PXW35

## (undated) DEVICE — SUTURE VICRYL+ 1 27IN CT-1 UND VCP261H

## (undated) DEVICE — STPL LINEAR CUT 75MM TLC75

## (undated) DEVICE — DRAIN BLAKE 19FR SIL 2231

## (undated) DEVICE — DECANTER VIAL 2006S

## (undated) DEVICE — GLOVE SURG PI ULTRA TOUCH M SZ 7 LF 42670

## (undated) DEVICE — LUBRICANT INST ELECTROLUBE EL101

## (undated) DEVICE — TUBING FILTER TRI-LUMEN AIRSEAL ASC-EVAC1

## (undated) DEVICE — DRAPE LAP/CHOLE W/O POUCH 89225

## (undated) DEVICE — ENDO TROCAR OPTICAL ACCESS KII Z-THRD 05X100MM CTR03

## (undated) DEVICE — DRESSING PRIMAPORE 4 X 3-1/8 66000317

## (undated) DEVICE — SOL WATER IRRIG 1000ML BOTTLE 2F7114

## (undated) RX ORDER — DEXAMETHASONE SODIUM PHOSPHATE 10 MG/ML
INJECTION, SOLUTION INTRAMUSCULAR; INTRAVENOUS
Status: DISPENSED
Start: 2024-10-22

## (undated) RX ORDER — CEFAZOLIN SODIUM 1 G/3ML
INJECTION, POWDER, FOR SOLUTION INTRAMUSCULAR; INTRAVENOUS
Status: DISPENSED
Start: 2024-10-22

## (undated) RX ORDER — BUPIVACAINE HYDROCHLORIDE 5 MG/ML
INJECTION, SOLUTION EPIDURAL; INTRACAUDAL
Status: DISPENSED
Start: 2024-10-22

## (undated) RX ORDER — ONDANSETRON 2 MG/ML
INJECTION INTRAMUSCULAR; INTRAVENOUS
Status: DISPENSED
Start: 2024-10-22

## (undated) RX ORDER — PROPOFOL 10 MG/ML
INJECTION, EMULSION INTRAVENOUS
Status: DISPENSED
Start: 2024-10-22

## (undated) RX ORDER — LIDOCAINE HYDROCHLORIDE 10 MG/ML
INJECTION, SOLUTION EPIDURAL; INFILTRATION; INTRACAUDAL; PERINEURAL
Status: DISPENSED
Start: 2024-10-22

## (undated) RX ORDER — PHENYLEPHRINE HYDROCHLORIDE 10 MG/ML
INJECTION INTRAVENOUS
Status: DISPENSED
Start: 2024-10-22

## (undated) RX ORDER — FENTANYL CITRATE 50 UG/ML
INJECTION, SOLUTION INTRAMUSCULAR; INTRAVENOUS
Status: DISPENSED
Start: 2024-10-22

## (undated) RX ORDER — FENTANYL CITRATE-0.9 % NACL/PF 10 MCG/ML
PLASTIC BAG, INJECTION (ML) INTRAVENOUS
Status: DISPENSED
Start: 2024-10-22